# Patient Record
Sex: FEMALE | Race: WHITE | NOT HISPANIC OR LATINO
[De-identification: names, ages, dates, MRNs, and addresses within clinical notes are randomized per-mention and may not be internally consistent; named-entity substitution may affect disease eponyms.]

---

## 2017-01-04 ENCOUNTER — APPOINTMENT (OUTPATIENT)
Dept: ORTHOPEDIC SURGERY | Facility: CLINIC | Age: 77
End: 2017-01-04

## 2017-01-04 VITALS
DIASTOLIC BLOOD PRESSURE: 79 MMHG | HEIGHT: 61 IN | BODY MASS INDEX: 29.07 KG/M2 | WEIGHT: 154 LBS | HEART RATE: 87 BPM | SYSTOLIC BLOOD PRESSURE: 139 MMHG

## 2017-01-04 RX ORDER — ALPRAZOLAM 0.5 MG/1
0.5 TABLET ORAL
Qty: 60 | Refills: 0 | Status: ACTIVE | COMMUNITY
Start: 2017-01-04 | End: 1900-01-01

## 2017-01-05 ENCOUNTER — APPOINTMENT (OUTPATIENT)
Dept: MRI IMAGING | Facility: HOSPITAL | Age: 77
End: 2017-01-05

## 2017-01-05 ENCOUNTER — OUTPATIENT (OUTPATIENT)
Dept: OUTPATIENT SERVICES | Facility: HOSPITAL | Age: 77
LOS: 1 days | End: 2017-01-05
Payer: MEDICARE

## 2017-01-05 PROCEDURE — 73721 MRI JNT OF LWR EXTRE W/O DYE: CPT

## 2017-01-12 ENCOUNTER — APPOINTMENT (OUTPATIENT)
Dept: ORTHOPEDIC SURGERY | Facility: CLINIC | Age: 77
End: 2017-01-12

## 2017-01-12 VITALS
HEIGHT: 61 IN | HEART RATE: 93 BPM | DIASTOLIC BLOOD PRESSURE: 79 MMHG | WEIGHT: 154 LBS | BODY MASS INDEX: 29.07 KG/M2 | SYSTOLIC BLOOD PRESSURE: 172 MMHG

## 2017-01-12 DIAGNOSIS — S83.231D COMPLEX TEAR OF MEDIAL MENISCUS, CURRENT INJURY, RIGHT KNEE, SUBSEQUENT ENCOUNTER: ICD-10-CM

## 2017-01-12 DIAGNOSIS — M19.90 UNSPECIFIED OSTEOARTHRITIS, UNSPECIFIED SITE: ICD-10-CM

## 2017-01-12 RX ORDER — ALPRAZOLAM 0.5 MG/1
0.5 TABLET ORAL
Qty: 60 | Refills: 0 | Status: ACTIVE | COMMUNITY
Start: 2017-01-12 | End: 1900-01-01

## 2017-01-12 RX ADMIN — Medication 0 MG/3ML: at 00:00

## 2017-01-14 PROBLEM — S83.231D COMPLEX TEAR OF MEDIAL MENISCUS OF RIGHT KNEE, UNSPECIFIED WHETHER OLD OR CURRENT TEAR, SUBSEQUENT ENCOUNTER: Status: ACTIVE | Noted: 2017-01-14

## 2017-01-20 RX ORDER — HYALURONATE SOD, CROSS-LINKED 30 MG/3 ML
30 SYRINGE (ML) INTRAARTICULAR
Qty: 0 | Refills: 0 | Status: COMPLETED | OUTPATIENT
Start: 2017-01-12

## 2017-02-01 ENCOUNTER — OUTPATIENT (OUTPATIENT)
Dept: OUTPATIENT SERVICES | Facility: HOSPITAL | Age: 77
LOS: 1 days | Discharge: HOME | End: 2017-02-01
Payer: MEDICARE

## 2017-02-01 VITALS
TEMPERATURE: 98 F | WEIGHT: 156.97 LBS | HEIGHT: 64.5 IN | SYSTOLIC BLOOD PRESSURE: 139 MMHG | DIASTOLIC BLOOD PRESSURE: 55 MMHG | OXYGEN SATURATION: 99 % | HEART RATE: 85 BPM | RESPIRATION RATE: 20 BRPM

## 2017-02-01 DIAGNOSIS — Z90.89 ACQUIRED ABSENCE OF OTHER ORGANS: Chronic | ICD-10-CM

## 2017-02-01 DIAGNOSIS — Z01.818 ENCOUNTER FOR OTHER PREPROCEDURAL EXAMINATION: ICD-10-CM

## 2017-02-01 DIAGNOSIS — S83.221A PERIPHERAL TEAR OF MEDIAL MENISCUS, CURRENT INJURY, RIGHT KNEE, INITIAL ENCOUNTER: ICD-10-CM

## 2017-02-01 DIAGNOSIS — S83.241A OTHER TEAR OF MEDIAL MENISCUS, CURRENT INJURY, RIGHT KNEE, INITIAL ENCOUNTER: ICD-10-CM

## 2017-02-01 DIAGNOSIS — I10 ESSENTIAL (PRIMARY) HYPERTENSION: ICD-10-CM

## 2017-02-01 DIAGNOSIS — Z85.3 PERSONAL HISTORY OF MALIGNANT NEOPLASM OF BREAST: ICD-10-CM

## 2017-02-01 DIAGNOSIS — M17.11 UNILATERAL PRIMARY OSTEOARTHRITIS, RIGHT KNEE: ICD-10-CM
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
## 2017-02-01 DIAGNOSIS — Z98.890 OTHER SPECIFIED POSTPROCEDURAL STATES: Chronic | ICD-10-CM
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
## 2017-02-01 LAB
ANION GAP SERPL CALC-SCNC: 8 MMOL/L — SIGNIFICANT CHANGE UP (ref 5–17)
BASOPHILS # BLD AUTO: 0.1 K/UL — SIGNIFICANT CHANGE UP (ref 0–0.2)
BASOPHILS NFR BLD AUTO: 1.2 % — SIGNIFICANT CHANGE UP (ref 0–2)
BUN SERPL-MCNC: 22 MG/DL — SIGNIFICANT CHANGE UP (ref 7–23)
CALCIUM SERPL-MCNC: 9.2 MG/DL — SIGNIFICANT CHANGE UP (ref 8.5–10.1)
CHLORIDE SERPL-SCNC: 103 MMOL/L — SIGNIFICANT CHANGE UP (ref 96–108)
CO2 SERPL-SCNC: 31 MMOL/L — SIGNIFICANT CHANGE UP (ref 22–31)
CREAT SERPL-MCNC: 0.57 MG/DL — SIGNIFICANT CHANGE UP (ref 0.5–1.3)
EOSINOPHIL # BLD AUTO: 0.2 K/UL — SIGNIFICANT CHANGE UP (ref 0–0.5)
EOSINOPHIL NFR BLD AUTO: 2.8 % — SIGNIFICANT CHANGE UP (ref 0–6)
GLUCOSE SERPL-MCNC: 87 MG/DL — SIGNIFICANT CHANGE UP (ref 70–99)
HCT VFR BLD CALC: 41.5 % — SIGNIFICANT CHANGE UP (ref 34.5–45)
HGB BLD-MCNC: 13.9 G/DL — SIGNIFICANT CHANGE UP (ref 11.5–15.5)
LYMPHOCYTES # BLD AUTO: 2.4 K/UL — SIGNIFICANT CHANGE UP (ref 1–3.3)
LYMPHOCYTES # BLD AUTO: 39.7 % — SIGNIFICANT CHANGE UP (ref 13–44)
MCHC RBC-ENTMCNC: 31 PG — SIGNIFICANT CHANGE UP (ref 27–34)
MCHC RBC-ENTMCNC: 33.5 GM/DL — SIGNIFICANT CHANGE UP (ref 32–36)
MCV RBC AUTO: 92.5 FL — SIGNIFICANT CHANGE UP (ref 80–100)
MONOCYTES # BLD AUTO: 0.5 K/UL — SIGNIFICANT CHANGE UP (ref 0–0.9)
MONOCYTES NFR BLD AUTO: 8.8 % — SIGNIFICANT CHANGE UP (ref 2–14)
NEUTROPHILS # BLD AUTO: 2.8 K/UL — SIGNIFICANT CHANGE UP (ref 1.8–7.4)
NEUTROPHILS NFR BLD AUTO: 47.5 % — SIGNIFICANT CHANGE UP (ref 43–77)
PLATELET # BLD AUTO: 287 K/UL — SIGNIFICANT CHANGE UP (ref 150–400)
POTASSIUM SERPL-MCNC: 4.5 MMOL/L — SIGNIFICANT CHANGE UP (ref 3.5–5.3)
POTASSIUM SERPL-SCNC: 4.5 MMOL/L — SIGNIFICANT CHANGE UP (ref 3.5–5.3)
RBC # BLD: 4.48 M/UL — SIGNIFICANT CHANGE UP (ref 3.8–5.2)
RBC # FLD: 12.1 % — SIGNIFICANT CHANGE UP (ref 10.3–14.5)
SODIUM SERPL-SCNC: 142 MMOL/L — SIGNIFICANT CHANGE UP (ref 135–145)
WBC # BLD: 5.9 K/UL — SIGNIFICANT CHANGE UP (ref 3.8–10.5)
WBC # FLD AUTO: 5.9 K/UL — SIGNIFICANT CHANGE UP (ref 3.8–10.5)

## 2017-02-01 PROCEDURE — 93010 ELECTROCARDIOGRAM REPORT: CPT

## 2017-02-01 RX ORDER — AMLODIPINE BESYLATE 2.5 MG/1
1 TABLET ORAL
Qty: 0 | Refills: 0 | COMMUNITY

## 2017-02-01 RX ORDER — METOPROLOL TARTRATE 50 MG
0 TABLET ORAL
Qty: 0 | Refills: 0 | COMMUNITY

## 2017-02-01 NOTE — H&P PST ADULT - HISTORY OF PRESENT ILLNESS
75 y/o female with right knee meniscus tear. Complain of right knee pain for 3 months now. "I was playing tennis when it started hurting." Pt has difficulty walking distances due to right knee pain. Here today for PST for right knee arthroscopy.

## 2017-02-01 NOTE — H&P PST ADULT - PSH
H/O repair of left rotator cuff  2012  History of tonsillectomy  8 y/o  S/P hysterectomy  in 1980  S/P lumpectomy of breast  left in 1985

## 2017-02-01 NOTE — H&P PST ADULT - NSANTHOSAYNRD_GEN_A_CORE
No. SHADI screening performed.  STOP BANG Legend: 0-2 = LOW Risk; 3-4 = INTERMEDIATE Risk; 5-8 = HIGH Risk

## 2017-02-01 NOTE — H&P PST ADULT - ASSESSMENT
77 y/o female with right knee meniscus tear. Scheduled for right knee arthroscopy with Dr. Gamboa. 77 y/o female with right knee meniscus tear. Scheduled for right knee arthroscopy with Dr. Gamboa.     Plan  1. Stop all NSAIDS, herbal supplements and vitamins for 7 days.  2. NPO at midnight.  3.Use EZ sponges as directed

## 2017-02-01 NOTE — H&P PST ADULT - PMH
Basal cell carcinoma  of face and chest - removed  Breast cancer  in 1985- received radiation treatment  Degenerative disc disease, lumbar    Gastric ulcer  2010  GERD (gastroesophageal reflux disease)    HTN (hypertension)    Osteoarthritis of right knee    Sprain rotator cuff  left, had surgery  Uterine fibroid  S/P hysterectomy

## 2017-02-01 NOTE — H&P PST ADULT - FAMILY HISTORY
Mother  Still living? Unknown  Family history of hypertension in mother, Age at diagnosis: Age Unknown     Father  Still living? No  Family history of lung cancer, Age at diagnosis: Age Unknown

## 2017-02-06 ENCOUNTER — RESULT REVIEW (OUTPATIENT)
Age: 77
End: 2017-02-06

## 2017-02-07 ENCOUNTER — OUTPATIENT (OUTPATIENT)
Dept: OUTPATIENT SERVICES | Facility: HOSPITAL | Age: 77
LOS: 1 days | Discharge: HOME | End: 2017-02-07
Payer: MEDICARE

## 2017-02-07 VITALS
HEART RATE: 82 BPM | TEMPERATURE: 97 F | HEIGHT: 61.5 IN | RESPIRATION RATE: 14 BRPM | OXYGEN SATURATION: 100 % | SYSTOLIC BLOOD PRESSURE: 127 MMHG | DIASTOLIC BLOOD PRESSURE: 70 MMHG | WEIGHT: 155.43 LBS

## 2017-02-07 VITALS
RESPIRATION RATE: 16 BRPM | SYSTOLIC BLOOD PRESSURE: 121 MMHG | DIASTOLIC BLOOD PRESSURE: 58 MMHG | OXYGEN SATURATION: 98 % | HEART RATE: 80 BPM

## 2017-02-07 DIAGNOSIS — Z98.890 OTHER SPECIFIED POSTPROCEDURAL STATES: Chronic | ICD-10-CM

## 2017-02-07 DIAGNOSIS — Z90.89 ACQUIRED ABSENCE OF OTHER ORGANS: Chronic | ICD-10-CM

## 2017-02-07 PROCEDURE — 88304 TISSUE EXAM BY PATHOLOGIST: CPT | Mod: 26

## 2017-02-07 RX ORDER — ASPIRIN/CALCIUM CARB/MAGNESIUM 324 MG
1 TABLET ORAL
Qty: 14 | Refills: 0
Start: 2017-02-07 | End: 2017-02-21

## 2017-02-07 RX ORDER — ONDANSETRON 8 MG/1
4 TABLET, FILM COATED ORAL EVERY 4 HOURS
Qty: 0 | Refills: 0 | Status: DISCONTINUED | OUTPATIENT
Start: 2017-02-07 | End: 2017-02-07

## 2017-02-07 RX ORDER — SODIUM CHLORIDE 9 MG/ML
1000 INJECTION, SOLUTION INTRAVENOUS
Qty: 0 | Refills: 0 | Status: DISCONTINUED | OUTPATIENT
Start: 2017-02-07 | End: 2017-02-22

## 2017-02-07 RX ORDER — KETOROLAC TROMETHAMINE 30 MG/ML
30 SYRINGE (ML) INJECTION ONCE
Qty: 0 | Refills: 0 | Status: DISCONTINUED | OUTPATIENT
Start: 2017-02-07 | End: 2017-02-07

## 2017-02-07 RX ORDER — FENTANYL CITRATE 50 UG/ML
25 INJECTION INTRAVENOUS
Qty: 0 | Refills: 0 | Status: DISCONTINUED | OUTPATIENT
Start: 2017-02-07 | End: 2017-02-07

## 2017-02-07 RX ORDER — ACETAMINOPHEN 500 MG
1000 TABLET ORAL ONCE
Qty: 0 | Refills: 0 | Status: DISCONTINUED | OUTPATIENT
Start: 2017-02-07 | End: 2017-02-07

## 2017-02-07 RX ORDER — OXYCODONE HYDROCHLORIDE 5 MG/1
1 TABLET ORAL
Qty: 40 | Refills: 0
Start: 2017-02-07

## 2017-02-07 RX ORDER — SODIUM CHLORIDE 9 MG/ML
1000 INJECTION, SOLUTION INTRAVENOUS
Qty: 0 | Refills: 0 | Status: DISCONTINUED | OUTPATIENT
Start: 2017-02-07 | End: 2017-02-07

## 2017-02-07 RX ADMIN — Medication 30 MILLIGRAM(S): at 09:58

## 2017-02-07 RX ADMIN — SODIUM CHLORIDE 75 MILLILITER(S): 9 INJECTION, SOLUTION INTRAVENOUS at 09:44

## 2017-02-07 RX ADMIN — FENTANYL CITRATE 25 MICROGRAM(S): 50 INJECTION INTRAVENOUS at 09:33

## 2017-02-07 RX ADMIN — FENTANYL CITRATE 25 MICROGRAM(S): 50 INJECTION INTRAVENOUS at 09:49

## 2017-02-07 NOTE — ASU DISCHARGE PLAN (ADULT/PEDIATRIC). - ACTIVITY LEVEL
weight bearing as tolerated/no heavy lifting/no exercise/no sports/gym elevate extremity/ice, 20min on 20min off while awake/no sports/gym/weight bearing as tolerated/no heavy lifting/no exercise

## 2017-02-07 NOTE — ASU DISCHARGE PLAN (ADULT/PEDIATRIC). - MEDICATION SUMMARY - MEDICATIONS TO TAKE
I will START or STAY ON the medications listed below when I get home from the hospital:    acetaminophen-oxyCODONE 325 mg-5 mg oral tablet  -- 1 tab(s) by mouth every 4 hours MDD:6 PRN pain  -- Caution federal law prohibits the transfer of this drug to any person other  than the person for whom it was prescribed.  May cause drowsiness.  Alcohol may intensify this effect.  Use care when operating dangerous machinery.  This prescription cannot be refilled.  This product contains acetaminophen.  Do not use  with any other product containing acetaminophen to prevent possible liver damage.  Using more of this medication than prescribed may cause serious breathing problems.    -- Indication: For prn pain    Ecotrin 325 mg oral delayed release tablet  -- 1 tab(s) by mouth once a day for ppx MDD:1  -- Swallow whole.  Do not crush.  Take with food or milk.    -- Indication: For dvt ppx    metoprolol tartrate 25 mg oral tablet  --  by mouth once a day (at bedtime)  -- Indication: For per pmd    amLODIPine 5 mg oral tablet  -- 1 tab(s) by mouth once a day (at bedtime)  -- Indication: For per pmd    Co Q-10 100 mg oral capsule  -- 1 cap(s) by mouth once a day  -- Indication: For per pmd    multivitamin  -- 1 tab(s) by mouth once a day  -- Indication: For per pmd    Vitamin D3 2000 intl units oral capsule  -- 2 cap(s) by mouth once a day  -- Indication: For per pmd

## 2017-02-07 NOTE — BRIEF OPERATIVE NOTE - POST-OP DX
Complex tear of medial meniscus of left knee as current injury, initial encounter  02/07/2017    Active  Shelton Redmond

## 2017-02-07 NOTE — ASU DISCHARGE PLAN (ADULT/PEDIATRIC). - NOTIFY
Swelling that continues/Numbness, color, or temperature change to extremity/Pain not relieved by Medications/Bleeding that does not stop Pain not relieved by Medications/Numbness, color, or temperature change to extremity/Unable to Urinate/Persistent Nausea and Vomiting/Bleeding that does not stop/Fever greater than 101/Swelling that continues

## 2017-02-07 NOTE — ASU DISCHARGE PLAN (ADULT/PEDIATRIC). - INSTRUCTIONS
Begin with liquids and light food ( tea, toast, Jello, soups). Advance to what you normally eat. Liquids should taken in adequate amounts today. call for follow up in 7-10 days

## 2017-02-07 NOTE — BRIEF OPERATIVE NOTE - PRE-OP DX
Complex tear of medial meniscus of right knee as current injury, initial encounter  02/07/2017    Active  Shelton Redmond

## 2017-02-07 NOTE — ASU DISCHARGE PLAN (ADULT/PEDIATRIC). - NURSING INSTRUCTIONS
If you cannot reach the doctor call [facility] Emergency Department at [phone] or go to your local Emergency Department.  A responsible adult should be with you for the rest of the day and night for your safety and to help you if you needed. Resume your medications as listed on the attached Medication Record.

## 2017-02-09 LAB — SURGICAL PATHOLOGY FINAL REPORT - CH: SIGNIFICANT CHANGE UP

## 2017-02-13 DIAGNOSIS — I10 ESSENTIAL (PRIMARY) HYPERTENSION: ICD-10-CM
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
## 2017-02-13 DIAGNOSIS — M17.11 UNILATERAL PRIMARY OSTEOARTHRITIS, RIGHT KNEE: ICD-10-CM

## 2017-02-13 DIAGNOSIS — Z88.8 ALLERGY STATUS TO OTHER DRUGS, MEDICAMENTS AND BIOLOGICAL SUBSTANCES STATUS: ICD-10-CM

## 2017-02-13 DIAGNOSIS — S83.241A OTHER TEAR OF MEDIAL MENISCUS, CURRENT INJURY, RIGHT KNEE, INITIAL ENCOUNTER: ICD-10-CM

## 2017-02-13 DIAGNOSIS — Z88.5 ALLERGY STATUS TO NARCOTIC AGENT: ICD-10-CM
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
## 2017-02-13 DIAGNOSIS — K21.9 GASTRO-ESOPHAGEAL REFLUX DISEASE WITHOUT ESOPHAGITIS: ICD-10-CM

## 2017-02-13 DIAGNOSIS — Z85.3 PERSONAL HISTORY OF MALIGNANT NEOPLASM OF BREAST: ICD-10-CM

## 2017-07-25 ENCOUNTER — APPOINTMENT (OUTPATIENT)
Dept: ULTRASOUND IMAGING | Facility: IMAGING CENTER | Age: 77
End: 2017-07-25

## 2017-07-25 ENCOUNTER — APPOINTMENT (OUTPATIENT)
Dept: MAMMOGRAPHY | Facility: IMAGING CENTER | Age: 77
End: 2017-07-25

## 2017-07-25 ENCOUNTER — OUTPATIENT (OUTPATIENT)
Dept: OUTPATIENT SERVICES | Facility: HOSPITAL | Age: 77
LOS: 1 days | End: 2017-07-25
Payer: MEDICARE

## 2017-07-25 DIAGNOSIS — Z00.8 ENCOUNTER FOR OTHER GENERAL EXAMINATION: ICD-10-CM

## 2017-07-25 DIAGNOSIS — Z90.89 ACQUIRED ABSENCE OF OTHER ORGANS: Chronic | ICD-10-CM

## 2017-07-25 DIAGNOSIS — Z98.890 OTHER SPECIFIED POSTPROCEDURAL STATES: Chronic | ICD-10-CM

## 2017-07-25 PROCEDURE — 77063 BREAST TOMOSYNTHESIS BI: CPT

## 2017-07-25 PROCEDURE — 77067 SCR MAMMO BI INCL CAD: CPT

## 2017-07-25 PROCEDURE — 76641 ULTRASOUND BREAST COMPLETE: CPT

## 2018-04-27 ENCOUNTER — APPOINTMENT (OUTPATIENT)
Dept: ORTHOPEDIC SURGERY | Facility: CLINIC | Age: 78
End: 2018-04-27
Payer: MEDICARE

## 2018-04-27 VITALS — WEIGHT: 150 LBS | HEIGHT: 61 IN | BODY MASS INDEX: 28.32 KG/M2

## 2018-04-27 VITALS — HEART RATE: 98 BPM | SYSTOLIC BLOOD PRESSURE: 189 MMHG | DIASTOLIC BLOOD PRESSURE: 97 MMHG

## 2018-04-27 DIAGNOSIS — S52.532A COLLES' FRACTURE OF LEFT RADIUS, INITIAL ENCOUNTER FOR CLOSED FRACTURE: ICD-10-CM

## 2018-04-27 PROCEDURE — 73110 X-RAY EXAM OF WRIST: CPT | Mod: LT

## 2018-04-27 PROCEDURE — 25600 CLTX DST RDL FX/EPHYS SEP WO: CPT | Mod: LT

## 2018-04-27 PROCEDURE — 99214 OFFICE O/P EST MOD 30 MIN: CPT | Mod: 57

## 2018-04-27 RX ORDER — SUCRALFATE 1 G/1
1 TABLET ORAL
Qty: 60 | Refills: 0 | Status: ACTIVE | COMMUNITY
Start: 2017-11-20

## 2018-04-27 RX ORDER — HYDROCHLOROTHIAZIDE 12.5 MG/1
12.5 TABLET ORAL
Qty: 45 | Refills: 0 | Status: ACTIVE | COMMUNITY
Start: 2017-11-20

## 2018-04-27 RX ORDER — METOPROLOL SUCCINATE 25 MG/1
25 TABLET, EXTENDED RELEASE ORAL
Qty: 90 | Refills: 0 | Status: ACTIVE | COMMUNITY
Start: 2017-11-20

## 2018-04-27 RX ORDER — TRAMADOL HYDROCHLORIDE 50 MG/1
50 TABLET, COATED ORAL
Qty: 30 | Refills: 0 | Status: ACTIVE | COMMUNITY
Start: 2018-04-27 | End: 1900-01-01

## 2018-04-27 RX ORDER — DICLOFENAC SODIUM 50 MG/1
50 TABLET, DELAYED RELEASE ORAL
Qty: 60 | Refills: 0 | Status: ACTIVE | COMMUNITY
Start: 2017-11-20

## 2018-05-07 ENCOUNTER — TRANSCRIPTION ENCOUNTER (OUTPATIENT)
Age: 78
End: 2018-05-07

## 2018-05-17 ENCOUNTER — APPOINTMENT (OUTPATIENT)
Dept: ORTHOPEDIC SURGERY | Facility: CLINIC | Age: 78
End: 2018-05-17
Payer: MEDICARE

## 2018-05-17 VITALS
SYSTOLIC BLOOD PRESSURE: 169 MMHG | BODY MASS INDEX: 28.32 KG/M2 | WEIGHT: 150 LBS | HEIGHT: 61 IN | DIASTOLIC BLOOD PRESSURE: 75 MMHG | HEART RATE: 89 BPM

## 2018-05-17 PROCEDURE — 99024 POSTOP FOLLOW-UP VISIT: CPT

## 2018-05-17 PROCEDURE — 73110 X-RAY EXAM OF WRIST: CPT | Mod: LT

## 2018-06-07 ENCOUNTER — APPOINTMENT (OUTPATIENT)
Dept: ORTHOPEDIC SURGERY | Facility: CLINIC | Age: 78
End: 2018-06-07
Payer: MEDICARE

## 2018-06-07 VITALS
WEIGHT: 150 LBS | HEIGHT: 61 IN | HEART RATE: 76 BPM | DIASTOLIC BLOOD PRESSURE: 82 MMHG | BODY MASS INDEX: 28.32 KG/M2 | SYSTOLIC BLOOD PRESSURE: 157 MMHG

## 2018-06-07 PROCEDURE — 73110 X-RAY EXAM OF WRIST: CPT

## 2018-06-07 PROCEDURE — 99024 POSTOP FOLLOW-UP VISIT: CPT

## 2018-07-03 ENCOUNTER — APPOINTMENT (OUTPATIENT)
Dept: ORTHOPEDIC SURGERY | Facility: CLINIC | Age: 78
End: 2018-07-03
Payer: MEDICARE

## 2018-07-03 VITALS
SYSTOLIC BLOOD PRESSURE: 148 MMHG | HEART RATE: 80 BPM | DIASTOLIC BLOOD PRESSURE: 77 MMHG | WEIGHT: 150 LBS | HEIGHT: 61 IN | BODY MASS INDEX: 28.32 KG/M2

## 2018-07-03 PROCEDURE — 73110 X-RAY EXAM OF WRIST: CPT

## 2018-07-03 PROCEDURE — 99024 POSTOP FOLLOW-UP VISIT: CPT

## 2018-07-27 ENCOUNTER — OUTPATIENT (OUTPATIENT)
Dept: OUTPATIENT SERVICES | Facility: HOSPITAL | Age: 78
LOS: 1 days | End: 2018-07-27
Payer: MEDICARE

## 2018-07-27 ENCOUNTER — APPOINTMENT (OUTPATIENT)
Dept: ULTRASOUND IMAGING | Facility: IMAGING CENTER | Age: 78
End: 2018-07-27
Payer: MEDICARE

## 2018-07-27 ENCOUNTER — APPOINTMENT (OUTPATIENT)
Dept: MAMMOGRAPHY | Facility: IMAGING CENTER | Age: 78
End: 2018-07-27
Payer: MEDICARE

## 2018-07-27 DIAGNOSIS — Z00.8 ENCOUNTER FOR OTHER GENERAL EXAMINATION: ICD-10-CM

## 2018-07-27 DIAGNOSIS — Z98.890 OTHER SPECIFIED POSTPROCEDURAL STATES: Chronic | ICD-10-CM

## 2018-07-27 DIAGNOSIS — Z90.89 ACQUIRED ABSENCE OF OTHER ORGANS: Chronic | ICD-10-CM

## 2018-07-27 PROBLEM — M17.11 UNILATERAL PRIMARY OSTEOARTHRITIS, RIGHT KNEE: Chronic | Status: ACTIVE | Noted: 2017-02-01

## 2018-07-27 PROBLEM — D25.9 LEIOMYOMA OF UTERUS, UNSPECIFIED: Chronic | Status: ACTIVE | Noted: 2017-02-01

## 2018-07-27 PROBLEM — C44.91 BASAL CELL CARCINOMA OF SKIN, UNSPECIFIED: Chronic | Status: ACTIVE | Noted: 2017-02-01

## 2018-07-27 PROBLEM — M51.36 OTHER INTERVERTEBRAL DISC DEGENERATION, LUMBAR REGION: Chronic | Status: ACTIVE | Noted: 2017-02-01

## 2018-07-27 PROCEDURE — 77063 BREAST TOMOSYNTHESIS BI: CPT | Mod: 26

## 2018-07-27 PROCEDURE — 77067 SCR MAMMO BI INCL CAD: CPT | Mod: 26

## 2018-07-27 PROCEDURE — 77063 BREAST TOMOSYNTHESIS BI: CPT

## 2018-07-27 PROCEDURE — 76641 ULTRASOUND BREAST COMPLETE: CPT

## 2018-07-27 PROCEDURE — 77067 SCR MAMMO BI INCL CAD: CPT

## 2018-07-27 PROCEDURE — 76641 ULTRASOUND BREAST COMPLETE: CPT | Mod: 26,50

## 2018-08-07 ENCOUNTER — APPOINTMENT (OUTPATIENT)
Dept: ORTHOPEDIC SURGERY | Facility: CLINIC | Age: 78
End: 2018-08-07
Payer: MEDICARE

## 2018-08-07 VITALS — SYSTOLIC BLOOD PRESSURE: 143 MMHG | DIASTOLIC BLOOD PRESSURE: 81 MMHG | HEART RATE: 87 BPM

## 2018-08-07 DIAGNOSIS — S52.532D COLLES' FRACTURE OF LEFT RADIUS, SUBSEQUENT ENCOUNTER FOR CLOSED FRACTURE WITH ROUTINE HEALING: ICD-10-CM

## 2018-08-07 PROCEDURE — 99213 OFFICE O/P EST LOW 20 MIN: CPT

## 2018-11-05 ENCOUNTER — OUTPATIENT (OUTPATIENT)
Dept: OUTPATIENT SERVICES | Facility: HOSPITAL | Age: 78
LOS: 1 days | End: 2018-11-05
Payer: MEDICARE

## 2018-11-05 ENCOUNTER — APPOINTMENT (OUTPATIENT)
Dept: RADIOLOGY | Facility: IMAGING CENTER | Age: 78
End: 2018-11-05
Payer: MEDICARE

## 2018-11-05 DIAGNOSIS — M85.80 OTHER SPECIFIED DISORDERS OF BONE DENSITY AND STRUCTURE, UNSPECIFIED SITE: ICD-10-CM

## 2018-11-05 DIAGNOSIS — Z98.890 OTHER SPECIFIED POSTPROCEDURAL STATES: Chronic | ICD-10-CM

## 2018-11-05 DIAGNOSIS — Z90.89 ACQUIRED ABSENCE OF OTHER ORGANS: Chronic | ICD-10-CM

## 2018-11-05 PROCEDURE — 77080 DXA BONE DENSITY AXIAL: CPT

## 2018-11-05 PROCEDURE — 77080 DXA BONE DENSITY AXIAL: CPT | Mod: 26

## 2018-11-06 ENCOUNTER — OUTPATIENT (OUTPATIENT)
Dept: OUTPATIENT SERVICES | Facility: HOSPITAL | Age: 78
LOS: 1 days | End: 2018-11-06
Payer: MEDICARE

## 2018-11-06 ENCOUNTER — APPOINTMENT (OUTPATIENT)
Dept: RADIOLOGY | Facility: HOSPITAL | Age: 78
End: 2018-11-06
Payer: MEDICARE

## 2018-11-06 DIAGNOSIS — R05 COUGH: ICD-10-CM

## 2018-11-06 DIAGNOSIS — Z90.89 ACQUIRED ABSENCE OF OTHER ORGANS: Chronic | ICD-10-CM

## 2018-11-06 DIAGNOSIS — Z98.890 OTHER SPECIFIED POSTPROCEDURAL STATES: Chronic | ICD-10-CM

## 2018-11-06 PROCEDURE — 71046 X-RAY EXAM CHEST 2 VIEWS: CPT

## 2018-11-06 PROCEDURE — 71046 X-RAY EXAM CHEST 2 VIEWS: CPT | Mod: 26

## 2019-09-19 ENCOUNTER — OUTPATIENT (OUTPATIENT)
Dept: OUTPATIENT SERVICES | Facility: HOSPITAL | Age: 79
LOS: 1 days | End: 2019-09-19
Payer: MEDICARE

## 2019-09-19 ENCOUNTER — APPOINTMENT (OUTPATIENT)
Dept: ULTRASOUND IMAGING | Facility: IMAGING CENTER | Age: 79
End: 2019-09-19
Payer: MEDICARE

## 2019-09-19 ENCOUNTER — APPOINTMENT (OUTPATIENT)
Dept: MAMMOGRAPHY | Facility: IMAGING CENTER | Age: 79
End: 2019-09-19
Payer: MEDICARE

## 2019-09-19 DIAGNOSIS — Z98.890 OTHER SPECIFIED POSTPROCEDURAL STATES: Chronic | ICD-10-CM

## 2019-09-19 DIAGNOSIS — Z90.89 ACQUIRED ABSENCE OF OTHER ORGANS: Chronic | ICD-10-CM

## 2019-09-19 DIAGNOSIS — Z00.8 ENCOUNTER FOR OTHER GENERAL EXAMINATION: ICD-10-CM

## 2019-09-19 PROCEDURE — 76641 ULTRASOUND BREAST COMPLETE: CPT

## 2019-09-19 PROCEDURE — 76641 ULTRASOUND BREAST COMPLETE: CPT | Mod: 26,50

## 2019-09-19 PROCEDURE — 77066 DX MAMMO INCL CAD BI: CPT

## 2019-09-19 PROCEDURE — 77066 DX MAMMO INCL CAD BI: CPT | Mod: 26

## 2019-09-19 PROCEDURE — G0279: CPT

## 2019-09-19 PROCEDURE — G0279: CPT | Mod: 26

## 2019-09-20 ENCOUNTER — TRANSCRIPTION ENCOUNTER (OUTPATIENT)
Age: 79
End: 2019-09-20

## 2019-09-24 ENCOUNTER — APPOINTMENT (OUTPATIENT)
Dept: ULTRASOUND IMAGING | Facility: IMAGING CENTER | Age: 79
End: 2019-09-24
Payer: MEDICARE

## 2019-09-24 ENCOUNTER — OUTPATIENT (OUTPATIENT)
Dept: OUTPATIENT SERVICES | Facility: HOSPITAL | Age: 79
LOS: 1 days | End: 2019-09-24
Payer: MEDICARE

## 2019-09-24 ENCOUNTER — RESULT REVIEW (OUTPATIENT)
Age: 79
End: 2019-09-24

## 2019-09-24 DIAGNOSIS — R92.8 OTHER ABNORMAL AND INCONCLUSIVE FINDINGS ON DIAGNOSTIC IMAGING OF BREAST: ICD-10-CM

## 2019-09-24 DIAGNOSIS — Z98.890 OTHER SPECIFIED POSTPROCEDURAL STATES: Chronic | ICD-10-CM

## 2019-09-24 DIAGNOSIS — Z90.89 ACQUIRED ABSENCE OF OTHER ORGANS: Chronic | ICD-10-CM

## 2019-09-24 PROCEDURE — 19083 BX BREAST 1ST LESION US IMAG: CPT | Mod: LT

## 2019-09-24 PROCEDURE — A4648: CPT

## 2019-09-24 PROCEDURE — 88305 TISSUE EXAM BY PATHOLOGIST: CPT | Mod: 26

## 2019-09-24 PROCEDURE — 19083 BX BREAST 1ST LESION US IMAG: CPT

## 2019-09-24 PROCEDURE — 88305 TISSUE EXAM BY PATHOLOGIST: CPT

## 2019-09-24 PROCEDURE — 77065 DX MAMMO INCL CAD UNI: CPT | Mod: 26,LT

## 2019-09-24 PROCEDURE — 77065 DX MAMMO INCL CAD UNI: CPT

## 2019-12-10 NOTE — ASU DISCHARGE PLAN (ADULT/PEDIATRIC). - RN NAME (PRINT)_____________________________________________
FOLLOW UP VISIT     Verbal permission granted by patient to leave a detailed message with medical information at phone number listed in Epic. yes    Patient is female, are you pregnant or breastfeeding ? No    Patient is here today with Self    Chief Complaint   Patient presents with   • Procedure     Biopsy on the left upper back      HISTORY OF PRESENT ILLNESS: The patient is a 37 year old  female seen today for follow up for biopsy on the left upper back, suspect nevus with 2 pigment patterns, growth since initial visit.   Patient was last seen on 10/23/19.      DERM-RELEVANT HISTORY:  History of skin cancer-Negative     -left abdomen, junctional nevus with moderate atypia, follow up of site recommended, biopsied 10/23/18,   -left upper back, lentiginous compound dysplastic nevus, biopsied 10/23/18, re-biopsy 10/23/19 showed recurrent nevus    Family history of skin cancer--No family history of skin cancer   History of tanning bed use- Yes in the past, not for the last 5 years  Outdoor hobbies- Hiking, walking, running  Sun protective measures-SPF30  Occupation-[employer] benefits for school [employer]  Verbal consent obtained to examine genitals, buttocks Yes.    PAST MEDICAL HISTORY:  [x]  None  []  Melanoma    []  Asthma []  Skin Cancer  []  Eczema []  Keloids  []  Allergies []  Psoriasis  []  Vitiligo      I have reviewed the past medical history, family history, social history, medications and allergies listed in the medical record as obtained by my nursing staff and support staff and agree with their documentation        PHYSICAL EXAM:   There were no vitals filed for this visit.  GENERAL:  Pleasant, alert, no acute distress, well-groomed.    Skin: Examination of the left upper back was performed and findings were within normal limits unless specified below.      Findings include:  -left upper back superior brown thin papule 5 mm with amorphus pigment at left edge and reticular pigment at  right    ASSESSMENT AND PLAN:   Neoplasm of uncertain behavior of the skin: Left upper back. Suspect 2 pigment pattern nevus, ddx includes atypical pigmented lesion.  Growth since initial visit.  Shave biopsy performed today as detailed below.    SHAVE BIOPSY PROCEDURE NOTE  Verbal informed consent was obtained after discussing risks including pain, bleeding, infection, recurrence, and scarring. The left upper back superior (site) was sterilely prepped with alcohol, which was allowed to dry completely, and then locally infiltrated with 1% lidocaine with  epinephrine, 1 ml total. A shave biopsy was obtained using a blue blade and the specimen was sent to pathology. Hemostasis was obtained with aluminum chloride. Petrolatum ointment and a clean dressing were applied. The patient tolerated the procedure well without complications. Verbal and written wound care instructions were given.    Return to clinic for yearly skin exam    On 12/10/2019, Liseth GOMEZ scribed the services personally performed by MD JASON Colmenares Allison J Schaus, MD, attest that the documentation recorded by the scribe accurately and completely reflects the service(s) I personally performed and the decisions made by me. I also reviewed and verified the scribe's note, which I edited as appropriate.              Statement Selected

## 2020-05-15 ENCOUNTER — EMERGENCY (EMERGENCY)
Facility: HOSPITAL | Age: 80
LOS: 1 days | Discharge: HOME | End: 2020-05-15
Attending: EMERGENCY MEDICINE | Admitting: EMERGENCY MEDICINE
Payer: MEDICARE

## 2020-05-15 VITALS
RESPIRATION RATE: 18 BRPM | SYSTOLIC BLOOD PRESSURE: 173 MMHG | WEIGHT: 149.91 LBS | DIASTOLIC BLOOD PRESSURE: 85 MMHG | TEMPERATURE: 98 F | HEIGHT: 62 IN | HEART RATE: 83 BPM | OXYGEN SATURATION: 100 %

## 2020-05-15 VITALS
HEART RATE: 70 BPM | DIASTOLIC BLOOD PRESSURE: 48 MMHG | OXYGEN SATURATION: 99 % | SYSTOLIC BLOOD PRESSURE: 143 MMHG | RESPIRATION RATE: 18 BRPM

## 2020-05-15 DIAGNOSIS — Z98.890 OTHER SPECIFIED POSTPROCEDURAL STATES: Chronic | ICD-10-CM

## 2020-05-15 DIAGNOSIS — Z90.89 ACQUIRED ABSENCE OF OTHER ORGANS: Chronic | ICD-10-CM

## 2020-05-15 LAB
ALBUMIN SERPL ELPH-MCNC: 3.6 G/DL — SIGNIFICANT CHANGE UP (ref 3.3–5)
ALP SERPL-CCNC: 66 U/L — SIGNIFICANT CHANGE UP (ref 40–120)
ALT FLD-CCNC: 16 U/L — SIGNIFICANT CHANGE UP (ref 10–45)
ANION GAP SERPL CALC-SCNC: 7 MMOL/L — SIGNIFICANT CHANGE UP (ref 5–17)
AST SERPL-CCNC: 18 U/L — SIGNIFICANT CHANGE UP (ref 10–40)
BILIRUB SERPL-MCNC: 0.6 MG/DL — SIGNIFICANT CHANGE UP (ref 0.2–1.2)
BUN SERPL-MCNC: 8 MG/DL — SIGNIFICANT CHANGE UP (ref 7–23)
CALCIUM SERPL-MCNC: 9.4 MG/DL — SIGNIFICANT CHANGE UP (ref 8.4–10.5)
CHLORIDE SERPL-SCNC: 106 MMOL/L — SIGNIFICANT CHANGE UP (ref 96–108)
CO2 SERPL-SCNC: 29 MMOL/L — SIGNIFICANT CHANGE UP (ref 22–31)
CREAT SERPL-MCNC: 0.78 MG/DL — SIGNIFICANT CHANGE UP (ref 0.5–1.3)
GLUCOSE SERPL-MCNC: 129 MG/DL — HIGH (ref 70–99)
HCT VFR BLD CALC: 43.1 % — SIGNIFICANT CHANGE UP (ref 34.5–45)
HGB BLD-MCNC: 14.2 G/DL — SIGNIFICANT CHANGE UP (ref 11.5–15.5)
LIDOCAIN IGE QN: 123 U/L — SIGNIFICANT CHANGE UP (ref 73–393)
MCHC RBC-ENTMCNC: 30.3 PG — SIGNIFICANT CHANGE UP (ref 27–34)
MCHC RBC-ENTMCNC: 32.9 GM/DL — SIGNIFICANT CHANGE UP (ref 32–36)
MCV RBC AUTO: 91.9 FL — SIGNIFICANT CHANGE UP (ref 80–100)
NRBC # BLD: 0 /100 WBCS — SIGNIFICANT CHANGE UP (ref 0–0)
PLATELET # BLD AUTO: 228 K/UL — SIGNIFICANT CHANGE UP (ref 150–400)
POTASSIUM SERPL-MCNC: 3.8 MMOL/L — SIGNIFICANT CHANGE UP (ref 3.5–5.3)
POTASSIUM SERPL-SCNC: 3.8 MMOL/L — SIGNIFICANT CHANGE UP (ref 3.5–5.3)
PROT SERPL-MCNC: 7 G/DL — SIGNIFICANT CHANGE UP (ref 6–8.3)
RBC # BLD: 4.69 M/UL — SIGNIFICANT CHANGE UP (ref 3.8–5.2)
RBC # FLD: 12.3 % — SIGNIFICANT CHANGE UP (ref 10.3–14.5)
SODIUM SERPL-SCNC: 142 MMOL/L — SIGNIFICANT CHANGE UP (ref 135–145)
TROPONIN I SERPL-MCNC: <.017 NG/ML — LOW (ref 0.02–0.06)
WBC # BLD: 5.89 K/UL — SIGNIFICANT CHANGE UP (ref 3.8–10.5)
WBC # FLD AUTO: 5.89 K/UL — SIGNIFICANT CHANGE UP (ref 3.8–10.5)

## 2020-05-15 PROCEDURE — 93005 ELECTROCARDIOGRAM TRACING: CPT

## 2020-05-15 PROCEDURE — 96374 THER/PROPH/DIAG INJ IV PUSH: CPT

## 2020-05-15 PROCEDURE — 93010 ELECTROCARDIOGRAM REPORT: CPT

## 2020-05-15 PROCEDURE — 99284 EMERGENCY DEPT VISIT MOD MDM: CPT

## 2020-05-15 PROCEDURE — 84484 ASSAY OF TROPONIN QUANT: CPT

## 2020-05-15 PROCEDURE — 83690 ASSAY OF LIPASE: CPT

## 2020-05-15 PROCEDURE — 85027 COMPLETE CBC AUTOMATED: CPT

## 2020-05-15 PROCEDURE — 71045 X-RAY EXAM CHEST 1 VIEW: CPT | Mod: 26

## 2020-05-15 PROCEDURE — 80053 COMPREHEN METABOLIC PANEL: CPT

## 2020-05-15 PROCEDURE — 99285 EMERGENCY DEPT VISIT HI MDM: CPT

## 2020-05-15 PROCEDURE — 36415 COLL VENOUS BLD VENIPUNCTURE: CPT

## 2020-05-15 PROCEDURE — 71045 X-RAY EXAM CHEST 1 VIEW: CPT

## 2020-05-15 RX ORDER — CHOLECALCIFEROL (VITAMIN D3) 125 MCG
2 CAPSULE ORAL
Qty: 0 | Refills: 0 | DISCHARGE

## 2020-05-15 RX ORDER — PANTOPRAZOLE SODIUM 20 MG/1
40 TABLET, DELAYED RELEASE ORAL ONCE
Refills: 0 | Status: COMPLETED | OUTPATIENT
Start: 2020-05-15 | End: 2020-05-15

## 2020-05-15 RX ORDER — LIDOCAINE 4 G/100G
5 CREAM TOPICAL ONCE
Refills: 0 | Status: COMPLETED | OUTPATIENT
Start: 2020-05-15 | End: 2020-05-15

## 2020-05-15 RX ORDER — FAMOTIDINE 10 MG/ML
1 INJECTION INTRAVENOUS
Qty: 30 | Refills: 0
Start: 2020-05-15 | End: 2020-05-29

## 2020-05-15 RX ADMIN — LIDOCAINE 5 MILLILITER(S): 4 CREAM TOPICAL at 10:36

## 2020-05-15 RX ADMIN — PANTOPRAZOLE SODIUM 40 MILLIGRAM(S): 20 TABLET, DELAYED RELEASE ORAL at 10:37

## 2020-05-15 RX ADMIN — Medication 30 MILLILITER(S): at 10:36

## 2020-05-15 NOTE — ED PROVIDER NOTE - CLINICAL SUMMARY MEDICAL DECISION MAKING FREE TEXT BOX
80 y/o F with h/o HTN (metoprolol, Norvasc), Anxiety (Xanax prn), GI ulcers (Nexium 40mg bid, Carafate, Zofran) pw exacerbation of acid reflux, belching and epigastric discomfort x 10 days. Follows GI Dr. Celeste Sagastume- last endoscopy was 3 years ago. negative for H. Pylori.  Denies shortness of breath, vomiting, diarrhea, blood in stool, fever, chills, weakness. Appears anxious on exam.  Denies smoking, ETOH history.  Denies fam h/o cardiac disease. Will obtain basic labs with Trop and Lipase, check CXR, administer Protonix/Maalox/Viscous Lido and reassess. Will need outpt GI follow up for updated Endoscopy 78 y/o F with h/o HTN (metoprolol, Norvasc), Anxiety (Xanax prn), GI ulcers (Nexium 40mg bid, Carafate, Zofran) pw exacerbation of acid reflux, belching and epigastric discomfort x 10 days. Follows GI Dr. Celeste Sagastume- last endoscopy was 3 years ago. negative for H. Pylori.  Denies shortness of breath, vomiting, diarrhea, blood in stool, fever, chills, weakness. Appears anxious on exam.  Denies smoking, ETOH history.  Denies family h/o cardiac disease. Will obtain basic labs with Trop and Lipase, check CXR, administer Protonix/Maalox/Viscous Lido and reassess. Will need outpatient GI follow up for updated Endoscopy 80 y/o F with h/o HTN (metoprolol, Norvasc), Anxiety (Xanax prn), GI ulcers (Nexium 40mg bid, Carafate, Zofran) pw exacerbation of acid reflux, belching and epigastric discomfort x 10 days. Follows GI Dr. Celeste Sagastume- last endoscopy was 3 years ago. negative for H. Pylori.  Denies shortness of breath, vomiting, diarrhea, blood in stool, fever, chills, weakness. Appears anxious on exam.  Denies smoking, ETOH history.  Denies family h/o cardiac disease. Will obtain basic labs with Trop and Lipase, check CXR, administer Protonix/Maalox/Viscous Lido and reassess. Will need outpatient GI follow up for updated Endoscopy  ** Update- Feeling slightly better s/p meds. Labs and Trops WNL. EKG and CXR WNL. Will DC with GI follow up. Strict ED return precautions discussed. Patient understands and agrees.

## 2020-05-15 NOTE — ED PROVIDER NOTE - CARE PROVIDER_API CALL
LUCIO HANNA  GASTROENTEROLOGY  [address] [place] [address]  Phone: [phone]  Fax: [phone]  Follow Up Time:

## 2020-05-15 NOTE — ED PROVIDER NOTE - ATTENDING CONTRIBUTION TO CARE
Denise with [facility]. 78 y/o F with h/o HTN (metoprolol, Norvasc), Anxiety (Xanax prn), GI ulcers (Nexium 40mg bid, Carafate, Zofran) pw exacerbation of acid reflux, belching and epigastric discomfort x 10 days. Follows GI Dr. Celeste Sagastume- last endoscopy was 3 years ago. negative for H. Pylori.  Denies shortness of breath, vomiting, diarrhea, blood in stool, fever, chills, weakness. Appears anxious on exam.  Denies smoking, ETOH history.  Denies family h/o cardiac disease. Will obtain basic labs with Trop and Lipase, check CXR, administer Protonix/Maalox/Viscous Lido and reassess. Will need outpatient GI follow up for updated Endoscopy  I performed a face to face bedside interview with patient regarding history of present illness, review of symptoms and past medical history. I completed an independent physical exam.  I have discussed the patient's plan of care with Physician Assistant (PA). I agree with note as stated above, having amended the EMR as needed to reflect my findings.   This includes History of Present Illness, HIV, Past Medical/Surgical/Family/Social History, Allergies and Home Medications, Review of Systems, Physical Exam, and any Progress Notes during the time I functioned as the attending physician for this patient.

## 2020-05-15 NOTE — ED ADULT NURSE NOTE - PSH
H/O repair of left rotator cuff  2012  History of tonsillectomy  10 y/o  S/P hysterectomy  in 1980  S/P lumpectomy of breast  left in 1985

## 2020-05-15 NOTE — ED PROVIDER NOTE - NSFOLLOWUPINSTRUCTIONS_ED_ALL_ED_FT
Follow up with Gastroenterology within the week- referral above or you can call: Find a Physician helpline ([phone]) for assistance   Pepcid 20mg 1 tab 2x/day 30 minutes before meals.   Sleep with your bed elevated.  Do not eat after 7pm  Decreased coffee, alcohol, spicy and acidic foods in your diet  Worsening, continued or ANY new concerning symptoms return to the emergency department.    Gastroesophageal Reflux Disease, Adult    Gastroesophageal reflux (RICO) happens when acid from the stomach flows up into the tube that connects the mouth and the stomach (esophagus). Normally, food travels down the esophagus and stays in the stomach to be digested. However, when a person has RICO, food and stomach acid sometimes move back up into the esophagus. If this becomes a more serious problem, the person may be diagnosed with a disease called gastroesophageal reflux disease (GERD). GERD occurs when the reflux:  Happens often. Causes frequent or severe symptoms. Causes problems such as damage to the esophagus.When stomach acid comes in contact with the esophagus, the acid may cause soreness (inflammation) in the esophagus. Over time, GERD may create small holes (ulcers) in the lining of the esophagus.  What are the causes?  This condition is caused by a problem with the muscle between the esophagus and the stomach (lower esophageal sphincter, or LES). Normally, the LES muscle closes after food passes through the esophagus to the stomach. When the LES is weakened or abnormal, it does not close properly, and that allows food and stomach acid to go back up into the esophagus.  The LES can be weakened by certain dietary substances, medicines, and medical conditions, including:  Tobacco use.Pregnancy.Having a hiatal hernia.Alcohol use.Certain foods and beverages, such as coffee, chocolate, onions, and peppermint.What increases the risk?  You are more likely to develop this condition if you:  Have an increased body weight.Have a connective tissue disorder.Use NSAID medicines.What are the signs or symptoms?  Symptoms of this condition include:  Heartburn.Difficult or painful swallowing.The feeling of having a lump in the throat.A bitter taste in the mouth.Bad breath.Having a large amount of saliva.Having an upset or bloated stomach.Belching.Chest pain. Different conditions can cause chest pain. Make sure you see your health care provider if you experience chest pain.Shortness of breath or wheezing.Ongoing (chronic) cough or a night-time cough.Wearing away of tooth enamel.Weight loss.How is this diagnosed?  Your health care provider will take a medical history and perform a physical exam. To determine if you have mild or severe GERD, your health care provider may also monitor how you respond to treatment. You may also have tests, including:  A test to examine your stomach and esophagus with a small camera (endoscopy).A test that measures the acidity level in your esophagus.A test that measures how much pressure is on your esophagus.A barium swallow or modified barium swallow test to show the shape, size, and functioning of your esophagus.How is this treated?  The goal of treatment is to help relieve your symptoms and to prevent complications. Treatment for this condition may vary depending on how severe your symptoms are. Your health care provider may recommend:  Changes to your diet.Medicine.Surgery.Follow these instructions at home:  Eating and drinking        Follow a diet as recommended by your health care provider. This may involve avoiding foods and drinks such as:  Coffee and tea (with or without caffeine).Drinks that contain alcohol.Energy drinks and sports drinks.Carbonated drinks or sodas.Chocolate and cocoa.Peppermint and mint flavorings.Garlic and onions.Horseradish.Spicy and acidic foods, including peppers, chili powder, carreno powder, vinegar, hot sauces, and barbecue sauce.Citrus fruit juices and citrus fruits, such as oranges, lopez, and limes.Tomato-based foods, such as red sauce, chili, salsa, and pizza with red sauce.Fried and fatty foods, such as donuts, french fries, potato chips, and high-fat dressings.High-fat meats, such as hot dogs and fatty cuts of red and white meats, such as rib eye steak, sausage, ham, and navarro.High-fat dairy items, such as whole milk, butter, and cream cheese.Eat small, frequent meals instead of large meals.Avoid drinking large amounts of liquid with your meals.Avoid eating meals during the 2–3 hours before bedtime.Avoid lying down right after you eat.Do not exercise right after you eat.Lifestyle        Do not use any products that contain nicotine or tobacco, such as cigarettes, e-cigarettes, and chewing tobacco. If you need help quitting, ask your health care provider.Try to reduce your stress by using methods such as yoga or meditation. If you need help reducing stress, ask your health care provider.If you are overweight, reduce your weight to an amount that is healthy for you. Ask your health care provider for guidance about a safe weight loss goal.General instructions     Pay attention to any changes in your symptoms.Take over-the-counter and prescription medicines only as told by your health care provider. Do not take aspirin, ibuprofen, or other NSAIDs unless your health care provider told you to do so.Wear loose-fitting clothing. Do not wear anything tight around your waist that causes pressure on your abdomen.Raise (elevate) the head of your bed about 6 inches (15 cm).Avoid bending over if this makes your symptoms worse.Keep all follow-up visits as told by your health care provider. This is important.Contact a health care provider if:  You have:  New symptoms.Unexplained weight loss.Difficulty swallowing or it hurts to swallow.Wheezing or a persistent cough.A hoarse voice.Your symptoms do not improve with treatment.Get help right away if you:  Have pain in your arms, neck, jaw, teeth, or back.Feel sweaty, dizzy, or light-headed.Have chest pain or shortness of breath.Vomit and your vomit looks like blood or coffee grounds.Faint.Have stool that is bloody or black.Cannot swallow, drink, or eat.Summary  Gastroesophageal reflux happens when acid from the stomach flows up into the esophagus. GERD is a disease in which the reflux happens often, causes frequent or severe symptoms, or causes problems such as damage to the esophagus.Treatment for this condition may vary depending on how severe your symptoms are. Your health care provider may recommend diet and lifestyle changes, medicine, or surgery.Contact a health care provider if you have new or worsening symptoms.Take over-the-counter and prescription medicines only as told by your health care provider. Do not take aspirin, ibuprofen, or other NSAIDs unless your health care provider told you to do so.Keep all follow-up visits as told by your health care provider. This is important.

## 2020-05-15 NOTE — ED PROVIDER NOTE - OBJECTIVE STATEMENT
80 y/o F with h/o HTN (metoprolol, Norvasc), Anxiety (Xanax prn), GI ulcers (Nexium 40mg bid, Carafate, Zofran) pw exacerbation of acid reflux, belching and epigastric discomfort x 10 days. Follows GI Dr. Celeste Sagastume- last endoscopy was 3 years ago. negative for H. Pylori.  Denies shortness of breath, vomiting, diarrhea, blood in stool, fever, chills, weakness. Appears anxious on exam.  Denies smoking, ETOH history  Denies fam h/o cardiac disease  PMD- Dr. Stout

## 2020-05-15 NOTE — ED PROVIDER NOTE - PATIENT PORTAL LINK FT
You can access the FollowMyHealth Patient Portal offered by [facility] by registering at the following website: [email or web address]. By joining [facility]’s FollowMyHealth portal, you will also be able to view your health information using other applications (apps) compatible with our system.

## 2020-05-17 ENCOUNTER — EMERGENCY (EMERGENCY)
Facility: HOSPITAL | Age: 80
LOS: 1 days | Discharge: HOME | End: 2020-05-17
Attending: EMERGENCY MEDICINE | Admitting: EMERGENCY MEDICINE
Payer: MEDICARE

## 2020-05-17 VITALS
DIASTOLIC BLOOD PRESSURE: 72 MMHG | RESPIRATION RATE: 17 BRPM | SYSTOLIC BLOOD PRESSURE: 158 MMHG | OXYGEN SATURATION: 98 % | HEART RATE: 84 BPM

## 2020-05-17 VITALS
TEMPERATURE: 99 F | SYSTOLIC BLOOD PRESSURE: 168 MMHG | DIASTOLIC BLOOD PRESSURE: 83 MMHG | WEIGHT: 149.91 LBS | RESPIRATION RATE: 18 BRPM | HEIGHT: 62 IN | HEART RATE: 88 BPM | OXYGEN SATURATION: 99 %

## 2020-05-17 DIAGNOSIS — Z90.89 ACQUIRED ABSENCE OF OTHER ORGANS: Chronic | ICD-10-CM

## 2020-05-17 DIAGNOSIS — R10.9 UNSPECIFIED ABDOMINAL PAIN: ICD-10-CM

## 2020-05-17 DIAGNOSIS — Z98.890 OTHER SPECIFIED POSTPROCEDURAL STATES: Chronic | ICD-10-CM

## 2020-05-17 LAB
ALBUMIN SERPL ELPH-MCNC: 3.7 G/DL — SIGNIFICANT CHANGE UP (ref 3.3–5)
ALP SERPL-CCNC: 66 U/L — SIGNIFICANT CHANGE UP (ref 40–120)
ALT FLD-CCNC: 20 U/L — SIGNIFICANT CHANGE UP (ref 10–45)
ANION GAP SERPL CALC-SCNC: 6 MMOL/L — SIGNIFICANT CHANGE UP (ref 5–17)
AST SERPL-CCNC: 22 U/L — SIGNIFICANT CHANGE UP (ref 10–40)
BASOPHILS # BLD AUTO: 0.03 K/UL — SIGNIFICANT CHANGE UP (ref 0–0.2)
BASOPHILS NFR BLD AUTO: 0.4 % — SIGNIFICANT CHANGE UP (ref 0–2)
BILIRUB SERPL-MCNC: 0.6 MG/DL — SIGNIFICANT CHANGE UP (ref 0.2–1.2)
BUN SERPL-MCNC: 7 MG/DL — SIGNIFICANT CHANGE UP (ref 7–23)
CALCIUM SERPL-MCNC: 9.5 MG/DL — SIGNIFICANT CHANGE UP (ref 8.4–10.5)
CHLORIDE SERPL-SCNC: 105 MMOL/L — SIGNIFICANT CHANGE UP (ref 96–108)
CO2 SERPL-SCNC: 28 MMOL/L — SIGNIFICANT CHANGE UP (ref 22–31)
CREAT SERPL-MCNC: 0.75 MG/DL — SIGNIFICANT CHANGE UP (ref 0.5–1.3)
EOSINOPHIL # BLD AUTO: 0.06 K/UL — SIGNIFICANT CHANGE UP (ref 0–0.5)
EOSINOPHIL NFR BLD AUTO: 0.9 % — SIGNIFICANT CHANGE UP (ref 0–6)
GLUCOSE SERPL-MCNC: 102 MG/DL — HIGH (ref 70–99)
HCT VFR BLD CALC: 41.2 % — SIGNIFICANT CHANGE UP (ref 34.5–45)
HGB BLD-MCNC: 13.6 G/DL — SIGNIFICANT CHANGE UP (ref 11.5–15.5)
IMM GRANULOCYTES NFR BLD AUTO: 0.3 % — SIGNIFICANT CHANGE UP (ref 0–1.5)
LIDOCAIN IGE QN: 107 U/L — SIGNIFICANT CHANGE UP (ref 73–393)
LYMPHOCYTES # BLD AUTO: 1.88 K/UL — SIGNIFICANT CHANGE UP (ref 1–3.3)
LYMPHOCYTES # BLD AUTO: 27.6 % — SIGNIFICANT CHANGE UP (ref 13–44)
MAGNESIUM SERPL-MCNC: 4 MG/DL — HIGH (ref 1.6–2.6)
MCHC RBC-ENTMCNC: 30.4 PG — SIGNIFICANT CHANGE UP (ref 27–34)
MCHC RBC-ENTMCNC: 33 GM/DL — SIGNIFICANT CHANGE UP (ref 32–36)
MCV RBC AUTO: 92 FL — SIGNIFICANT CHANGE UP (ref 80–100)
MONOCYTES # BLD AUTO: 0.53 K/UL — SIGNIFICANT CHANGE UP (ref 0–0.9)
MONOCYTES NFR BLD AUTO: 7.8 % — SIGNIFICANT CHANGE UP (ref 2–14)
NEUTROPHILS # BLD AUTO: 4.28 K/UL — SIGNIFICANT CHANGE UP (ref 1.8–7.4)
NEUTROPHILS NFR BLD AUTO: 63 % — SIGNIFICANT CHANGE UP (ref 43–77)
NRBC # BLD: 0 /100 WBCS — SIGNIFICANT CHANGE UP (ref 0–0)
PLATELET # BLD AUTO: 225 K/UL — SIGNIFICANT CHANGE UP (ref 150–400)
POTASSIUM SERPL-MCNC: 3.8 MMOL/L — SIGNIFICANT CHANGE UP (ref 3.5–5.3)
POTASSIUM SERPL-SCNC: 3.8 MMOL/L — SIGNIFICANT CHANGE UP (ref 3.5–5.3)
PROT SERPL-MCNC: 7 G/DL — SIGNIFICANT CHANGE UP (ref 6–8.3)
RBC # BLD: 4.48 M/UL — SIGNIFICANT CHANGE UP (ref 3.8–5.2)
RBC # FLD: 12.3 % — SIGNIFICANT CHANGE UP (ref 10.3–14.5)
SODIUM SERPL-SCNC: 139 MMOL/L — SIGNIFICANT CHANGE UP (ref 135–145)
WBC # BLD: 6.8 K/UL — SIGNIFICANT CHANGE UP (ref 3.8–10.5)
WBC # FLD AUTO: 6.8 K/UL — SIGNIFICANT CHANGE UP (ref 3.8–10.5)

## 2020-05-17 PROCEDURE — 93010 ELECTROCARDIOGRAM REPORT: CPT

## 2020-05-17 PROCEDURE — 74018 RADEX ABDOMEN 1 VIEW: CPT

## 2020-05-17 PROCEDURE — 83690 ASSAY OF LIPASE: CPT

## 2020-05-17 PROCEDURE — 99284 EMERGENCY DEPT VISIT MOD MDM: CPT

## 2020-05-17 PROCEDURE — 74018 RADEX ABDOMEN 1 VIEW: CPT | Mod: 26

## 2020-05-17 PROCEDURE — 83735 ASSAY OF MAGNESIUM: CPT

## 2020-05-17 PROCEDURE — 80053 COMPREHEN METABOLIC PANEL: CPT

## 2020-05-17 PROCEDURE — 99284 EMERGENCY DEPT VISIT MOD MDM: CPT | Mod: 25

## 2020-05-17 PROCEDURE — 93005 ELECTROCARDIOGRAM TRACING: CPT

## 2020-05-17 PROCEDURE — 96374 THER/PROPH/DIAG INJ IV PUSH: CPT

## 2020-05-17 PROCEDURE — 85027 COMPLETE CBC AUTOMATED: CPT

## 2020-05-17 RX ORDER — SODIUM CHLORIDE 9 MG/ML
1000 INJECTION INTRAMUSCULAR; INTRAVENOUS; SUBCUTANEOUS ONCE
Refills: 0 | Status: COMPLETED | OUTPATIENT
Start: 2020-05-17 | End: 2020-05-17

## 2020-05-17 RX ORDER — DIAZEPAM 5 MG
5 TABLET ORAL ONCE
Refills: 0 | Status: DISCONTINUED | OUTPATIENT
Start: 2020-05-17 | End: 2020-05-17

## 2020-05-17 RX ORDER — PANTOPRAZOLE SODIUM 20 MG/1
40 TABLET, DELAYED RELEASE ORAL ONCE
Refills: 0 | Status: COMPLETED | OUTPATIENT
Start: 2020-05-17 | End: 2020-05-17

## 2020-05-17 RX ADMIN — PANTOPRAZOLE SODIUM 40 MILLIGRAM(S): 20 TABLET, DELAYED RELEASE ORAL at 15:36

## 2020-05-17 RX ADMIN — Medication 5 MILLIGRAM(S): at 15:36

## 2020-05-17 RX ADMIN — SODIUM CHLORIDE 1000 MILLILITER(S): 9 INJECTION INTRAMUSCULAR; INTRAVENOUS; SUBCUTANEOUS at 15:37

## 2020-05-17 NOTE — ED PROVIDER NOTE - ATTENDING CONTRIBUTION TO CARE
Dr. Cedeno: I performed a face to face bedside interview with patient regarding history of present illness, review of symptoms and past medical history. I completed an independent physical exam.  I have discussed patient's plan of care with PA.   I agree with note as stated above, having amended the EMR as needed to reflect my findings.   This includes HISTORY OF PRESENT ILLNESS, HIV, PAST MEDICAL/SURGICAL/FAMILY/SOCIAL HISTORY, ALLERGIES AND HOME MEDICATIONS, REVIEW OF SYSTEMS, PHYSICAL EXAM, and any PROGRESS NOTES during the time I functioned as the attending physician for this patient.    see mdm

## 2020-05-17 NOTE — ED PROVIDER NOTE - OBJECTIVE STATEMENT
78 yo female with h/o HTN, GERD/PUD presents to the ED c/o epigastric burning with radiation into chest x 1 week. Pain is constant, worse with eating. Patient was in ED 2 days ago, had labs, ekg, cxr - unremarkable and discharged home with pepcid. Associated with nausea and belching, no vomiting or diarrhea. Patient unable to get an appointment with her GI doctor (Dr. Celeste Sagastume). Last endoscopy 3 years ago. + h/o c/s, no additional abdominal surgery. Denies fever, chills, sob, vomiting, diarrhea, urinary sxs, flank pain. Patient tolerating PO but decreased.

## 2020-05-17 NOTE — ED PROVIDER NOTE - CLINICAL SUMMARY MEDICAL DECISION MAKING FREE TEXT BOX
Dr. Cedeno: 79F h/o HTN, GERD, PUD, supposed to have an EGD but hasn't yet, p/w 1 week of epigastric burning and belching which feels exactly like her GERD. Seen at OSH 2 days ago for the same, had normal labs including trop, returned here because she wants a stat EGD. Pt also c/o feeling anxious. No chest pain or sob. +nausea, no vomiting, no diarrhea. No abdo pain. No fever. On exam pt is anxious appearing but nad, rrr, ctab, abdo soft/nt/nd, no cvat. Likely GERD, will check lytes, tx symptomatically, outpatient GI f/u

## 2020-05-17 NOTE — ED PROVIDER NOTE - CARE PROVIDER_API CALL
LUCIO HANNA  GASTROENTEROLOGY  [address] [place] [address]  Phone: [phone]  Fax: [phone]  Follow Up Time:

## 2020-05-17 NOTE — ED PROVIDER NOTE - PROGRESS NOTE DETAILS
Patient feeling significantly better after meds, pending lab and xrays. Reevaluated patient at bedside.  Patient feeling much improved.  Discussed the results of all diagnostic testing in ED and copies of all available reports given.   An opportunity to ask questions was provided.  Discussed the importance of prompt, close medical follow-up.  Patient will return with any changes, concerns or persistent/worsening symptoms.  Understanding of all instructions verbalized.

## 2020-05-17 NOTE — ED ADULT TRIAGE NOTE - HEIGHT IN INCHES
Patient informed Rx printed and ready to be picked up at our office.  
Written rx for levothyroxine 137 mcg 90 day supply    
2

## 2020-05-17 NOTE — ED PROVIDER NOTE - PATIENT PORTAL LINK FT
You can access the FollowMyHealth Patient Portal offered by [facility] by registering at the following website: [email or web address]. By joining [facility]’s FollowMyHealth portal, you will also be able to view your health information using other applications (apps) compatible with our system.

## 2020-05-17 NOTE — ED ADULT NURSE NOTE - NSFALLRSKASSESASSIST_ED_ALL_ED
Attending Only no I have personally performed a face to face diagnostic evaluation on this patient. I have reviewed the ACP note and agree with the history, exam and plan of care, except as noted.

## 2020-05-21 ENCOUNTER — EMERGENCY (EMERGENCY)
Facility: HOSPITAL | Age: 80
LOS: 1 days | Discharge: HOME | End: 2020-05-21
Attending: EMERGENCY MEDICINE
Payer: MEDICARE

## 2020-05-21 VITALS
DIASTOLIC BLOOD PRESSURE: 66 MMHG | SYSTOLIC BLOOD PRESSURE: 148 MMHG | OXYGEN SATURATION: 99 % | RESPIRATION RATE: 18 BRPM | HEART RATE: 89 BPM

## 2020-05-21 VITALS
SYSTOLIC BLOOD PRESSURE: 180 MMHG | WEIGHT: 147.93 LBS | RESPIRATION RATE: 20 BRPM | HEIGHT: 62 IN | DIASTOLIC BLOOD PRESSURE: 76 MMHG | TEMPERATURE: 98 F | HEART RATE: 114 BPM | OXYGEN SATURATION: 100 %

## 2020-05-21 DIAGNOSIS — Z98.890 OTHER SPECIFIED POSTPROCEDURAL STATES: Chronic | ICD-10-CM

## 2020-05-21 DIAGNOSIS — Z90.89 ACQUIRED ABSENCE OF OTHER ORGANS: Chronic | ICD-10-CM

## 2020-05-21 LAB
ALBUMIN SERPL ELPH-MCNC: 4.4 G/DL — SIGNIFICANT CHANGE UP (ref 3.3–5)
ALP SERPL-CCNC: 70 U/L — SIGNIFICANT CHANGE UP (ref 40–120)
ALT FLD-CCNC: 20 U/L — SIGNIFICANT CHANGE UP (ref 10–45)
ANION GAP SERPL CALC-SCNC: 13 MMOL/L — SIGNIFICANT CHANGE UP (ref 5–17)
APTT BLD: 28.6 SEC — SIGNIFICANT CHANGE UP (ref 27.5–36.3)
AST SERPL-CCNC: 22 U/L — SIGNIFICANT CHANGE UP (ref 10–40)
BILIRUB SERPL-MCNC: 0.8 MG/DL — SIGNIFICANT CHANGE UP (ref 0.2–1.2)
BUN SERPL-MCNC: 12 MG/DL — SIGNIFICANT CHANGE UP (ref 7–23)
CALCIUM SERPL-MCNC: 9.6 MG/DL — SIGNIFICANT CHANGE UP (ref 8.4–10.5)
CHLORIDE SERPL-SCNC: 104 MMOL/L — SIGNIFICANT CHANGE UP (ref 96–108)
CO2 SERPL-SCNC: 24 MMOL/L — SIGNIFICANT CHANGE UP (ref 22–31)
CREAT SERPL-MCNC: 0.66 MG/DL — SIGNIFICANT CHANGE UP (ref 0.5–1.3)
GLUCOSE SERPL-MCNC: 113 MG/DL — HIGH (ref 70–99)
HCT VFR BLD CALC: 43.7 % — SIGNIFICANT CHANGE UP (ref 34.5–45)
HGB BLD-MCNC: 14.2 G/DL — SIGNIFICANT CHANGE UP (ref 11.5–15.5)
INR BLD: 1.06 RATIO — SIGNIFICANT CHANGE UP (ref 0.88–1.16)
LIDOCAIN IGE QN: 33 U/L — SIGNIFICANT CHANGE UP (ref 7–60)
MCHC RBC-ENTMCNC: 29.8 PG — SIGNIFICANT CHANGE UP (ref 27–34)
MCHC RBC-ENTMCNC: 32.5 GM/DL — SIGNIFICANT CHANGE UP (ref 32–36)
MCV RBC AUTO: 91.8 FL — SIGNIFICANT CHANGE UP (ref 80–100)
NRBC # BLD: 0 /100 WBCS — SIGNIFICANT CHANGE UP (ref 0–0)
PLATELET # BLD AUTO: 224 K/UL — SIGNIFICANT CHANGE UP (ref 150–400)
POTASSIUM SERPL-MCNC: 3.3 MMOL/L — LOW (ref 3.5–5.3)
POTASSIUM SERPL-SCNC: 3.3 MMOL/L — LOW (ref 3.5–5.3)
PROT SERPL-MCNC: 7.1 G/DL — SIGNIFICANT CHANGE UP (ref 6–8.3)
PROTHROM AB SERPL-ACNC: 12.1 SEC — SIGNIFICANT CHANGE UP (ref 10–12.9)
RBC # BLD: 4.76 M/UL — SIGNIFICANT CHANGE UP (ref 3.8–5.2)
RBC # FLD: 12.2 % — SIGNIFICANT CHANGE UP (ref 10.3–14.5)
SODIUM SERPL-SCNC: 141 MMOL/L — SIGNIFICANT CHANGE UP (ref 135–145)
TROPONIN T, HIGH SENSITIVITY RESULT: 7 NG/L — SIGNIFICANT CHANGE UP (ref 0–51)
TROPONIN T, HIGH SENSITIVITY RESULT: 9 NG/L — SIGNIFICANT CHANGE UP (ref 0–51)
WBC # BLD: 9.24 K/UL — SIGNIFICANT CHANGE UP (ref 3.8–10.5)
WBC # FLD AUTO: 9.24 K/UL — SIGNIFICANT CHANGE UP (ref 3.8–10.5)

## 2020-05-21 PROCEDURE — 99285 EMERGENCY DEPT VISIT HI MDM: CPT | Mod: CS,GC

## 2020-05-21 RX ORDER — METOCLOPRAMIDE HCL 10 MG
10 TABLET ORAL ONCE
Refills: 0 | Status: COMPLETED | OUTPATIENT
Start: 2020-05-21 | End: 2020-05-21

## 2020-05-21 RX ORDER — METOCLOPRAMIDE HCL 10 MG
1 TABLET ORAL
Qty: 5 | Refills: 0
Start: 2020-05-21

## 2020-05-21 RX ORDER — SODIUM CHLORIDE 9 MG/ML
1000 INJECTION INTRAMUSCULAR; INTRAVENOUS; SUBCUTANEOUS ONCE
Refills: 0 | Status: COMPLETED | OUTPATIENT
Start: 2020-05-21 | End: 2020-05-21

## 2020-05-21 RX ORDER — FAMOTIDINE 10 MG/ML
20 INJECTION INTRAVENOUS ONCE
Refills: 0 | Status: COMPLETED | OUTPATIENT
Start: 2020-05-21 | End: 2020-05-21

## 2020-05-21 RX ADMIN — FAMOTIDINE 20 MILLIGRAM(S): 10 INJECTION INTRAVENOUS at 15:21

## 2020-05-21 RX ADMIN — Medication 1 MILLIGRAM(S): at 15:22

## 2020-05-21 RX ADMIN — SODIUM CHLORIDE 1000 MILLILITER(S): 9 INJECTION INTRAMUSCULAR; INTRAVENOUS; SUBCUTANEOUS at 15:22

## 2020-05-21 RX ADMIN — Medication 10 MILLIGRAM(S): at 15:22

## 2020-05-21 NOTE — ED PROVIDER NOTE - PHYSICAL EXAMINATION
General: alert, conversant, looks uncomfortable but not toxic, no active vomiting, vitals reassuring  Head: atraumatic, normocephalic  Eyes: PERRL, EOMI, no scleral icterus  ENT: no epistaxis, moist mucous membranes, normal phonation, airway patent  Neck: full ROM, no midline ttp  CV: RRR, no murmurs, HDS  Pulm: lungs CTA b/l, no wheezing, no respiratory distress  GI: abd soft, non tender, no guarding/rebound/masses  Back: normal ROM, no midline ttp, no signs of trauma  Extremities: normal ROM, joints stable, distal pulses intact, no edema  Neuro: alert, oriented x3, moving all extremities, interactive, normal speech/memory/gait  Derm: warm, dry, normal color, no rash/wounds

## 2020-05-21 NOTE — ED PROVIDER NOTE - NSFOLLOWUPINSTRUCTIONS_ED_ALL_ED_FT
You are likely having symptoms from gastritis as you have had before.    Follow up with your doctor next week as planned.    Take the reglan twice daily.    Take the ativan twice daily but as needed. Do not combine with xanax, drink, or drive on this medication.    Please return to ER for new or concerning symptoms.

## 2020-05-21 NOTE — ED PROVIDER NOTE - PROGRESS NOTE DETAILS
Haverty, PGY2- lab work unremarkable, feeling much better, has f/u appt in 6 days, will d/c with short course of ERX for ativan/reglan, stable for d/c, pt understands and amenable to plan

## 2020-05-21 NOTE — ED PROVIDER NOTE - OBJECTIVE STATEMENT
79 yoM, PMHx of mild treated HTN, GERD, PUD returns to ED after 2 prior visits for similar, epigastric pain/vomiting. Has been scoped several time in past, last 3 years ago. Never had serious upper/lower GIB. Feels nauseated and pain with every oral intake. Also feels very anxious because she is in too much pain to sleep or do any activities. Does not smoke, drink or use drugs. Has seen GI physician twice this week, Dr. Jhonatan Eid, who feels needs scope but unable to because of covid. Denies any diarrhea, SOB, cough, or back pain. No prior surgeries. 10 pound weight loss in last 3 weeks. Has tried maalox, PPI, pepcid, xanax, Carafate, and Pepto-Bismol.

## 2020-05-21 NOTE — ED PROVIDER NOTE - ATTENDING CONTRIBUTION TO CARE
I saw and evaluated patient with resident. I discussed H+P and MDM with resident. I agree with the statements made by the resident unless otherwise noted.    The care of this patient was in support of the [facility] countermeasures to Covid-19.

## 2020-05-21 NOTE — ED PROVIDER NOTE - NS ED ROS FT
ROS:  Gen: fever ( - ) chills ( - ) weakness ( - )  Eyes: pain ( - ) visual changes ( - ) discharge ( - )  ENT: epistaxis ( - ) odynophagia ( - ) hearing changes ( - )  CV: chest pain ( - ) palpitations ( - ) syncope ( - )  Resp: cough ( - ) SOB ( - ) hemoptysis ( - )  GI: abd pain ( + ) vomiting ( + ) diarrhea ( - )   : genital pain ( - ) dysuria ( - ) vag bleeding ( - )  Derm: rash ( - ) laceration ( - ) wound ( - )  MSK: back pain ( - ) neck pain ( - ) joint pain ( - ) myalgias ( - )  Neuro: seizures ( - ) headache ( - ) AMS ( - )  Endocrine: polydipsia ( - ) polyuria ( - ) weight change ( - )

## 2020-05-21 NOTE — ED PROVIDER NOTE - CLINICAL SUMMARY MEDICAL DECISION MAKING FREE TEXT BOX
Didi, PGY2- MDM: 79 yoF, PUD, GERD, HTN, 3rd visit for symptoms of likely gastritis/reflux, PUD this week, x2 at [place] ed, still with sx, saw GI but unable to obtain scope, oral medications not working, cannot eat  vitals reassuring, uncomfortable but not toxic, abd soft, no masses, not peritoneal  likely exacerbation of sx, not concerning for cardiopulmonary process, less likely surgical issues given normal abd exam  will send labs to asses for lytes, lipase, troponin but mostly hydrate and treat sx, will reeval after meds Didi, PGY2- MDM: 79 yoF, PUD, GERD, HTN, 3rd visit for symptoms of likely gastritis/reflux, PUD this week, x2 at [place] ed, still with sx, saw GI but unable to obtain scope, oral medications not working, cannot eat  vitals reassuring, uncomfortable but not toxic, abd soft, no masses, not peritoneal  likely exacerbation of sx, not concerning for cardiopulmonary process, less likely surgical issues given normal abd exam  will send labs to [facility] for lytes, lipase, troponin but mostly hydrate and treat sx, will reeval after avis George MD: Pt is a 80 y/o female with PMHx of mild treated HTN, GERD, PUD returns to ED after 2 prior visits for similar, epigastric pain/vomiting. Has been scoped several time in past, last 3 years ago. Never had serious upper/lower GIB. Feels nauseated and pain with every oral intake. Also feels very anxious because she is in too much pain to sleep or do any activities. Does not smoke, drink or use drugs. Has seen GI physician twice this week, Dr. Jhonatan Eid, who feels needs scope but unable to because of covid. Denies any diarrhea, SOB, cough, or back pain. No prior surgeries. 10 pound weight loss in last 3 weeks. Has tried maalox, PPI, pepcid, xanax, Carafate, and Pepto-Bismol. Ddx includes, however, is not limited to: GERD, gastritis, PUD, anxiety, ACS, other. Plan: basic labs, cardiac w/u, CXR, pain control, reassess for need for outpt vs. inpt further GI w/u

## 2020-05-23 ENCOUNTER — INPATIENT (INPATIENT)
Facility: HOSPITAL | Age: 80
LOS: 3 days | Discharge: HOME | DRG: 392 | End: 2020-05-27
Attending: INTERNAL MEDICINE | Admitting: INTERNAL MEDICINE
Payer: MEDICARE

## 2020-05-23 VITALS
OXYGEN SATURATION: 100 % | SYSTOLIC BLOOD PRESSURE: 187 MMHG | RESPIRATION RATE: 20 BRPM | HEART RATE: 94 BPM | HEIGHT: 61.5 IN | DIASTOLIC BLOOD PRESSURE: 92 MMHG | TEMPERATURE: 98 F | WEIGHT: 145.95 LBS

## 2020-05-23 DIAGNOSIS — R10.13 EPIGASTRIC PAIN: ICD-10-CM

## 2020-05-23 DIAGNOSIS — Z90.89 ACQUIRED ABSENCE OF OTHER ORGANS: Chronic | ICD-10-CM

## 2020-05-23 DIAGNOSIS — I10 ESSENTIAL (PRIMARY) HYPERTENSION: ICD-10-CM

## 2020-05-23 DIAGNOSIS — F41.9 ANXIETY DISORDER, UNSPECIFIED: ICD-10-CM

## 2020-05-23 DIAGNOSIS — Z98.890 OTHER SPECIFIED POSTPROCEDURAL STATES: Chronic | ICD-10-CM

## 2020-05-23 LAB
ALBUMIN SERPL ELPH-MCNC: 4.3 G/DL — SIGNIFICANT CHANGE UP (ref 3.3–5)
ALP SERPL-CCNC: 66 U/L — SIGNIFICANT CHANGE UP (ref 40–120)
ALT FLD-CCNC: 20 U/L — SIGNIFICANT CHANGE UP (ref 10–45)
ANION GAP SERPL CALC-SCNC: 12 MMOL/L — SIGNIFICANT CHANGE UP (ref 5–17)
AST SERPL-CCNC: 21 U/L — SIGNIFICANT CHANGE UP (ref 10–40)
BASE EXCESS BLDV CALC-SCNC: 2.3 MMOL/L — HIGH (ref -2–2)
BASE EXCESS BLDV CALC-SCNC: 2.9 MMOL/L — HIGH (ref -2–2)
BASOPHILS # BLD AUTO: 0.03 K/UL — SIGNIFICANT CHANGE UP (ref 0–0.2)
BASOPHILS NFR BLD AUTO: 0.5 % — SIGNIFICANT CHANGE UP (ref 0–2)
BILIRUB SERPL-MCNC: 0.6 MG/DL — SIGNIFICANT CHANGE UP (ref 0.2–1.2)
BUN SERPL-MCNC: 7 MG/DL — SIGNIFICANT CHANGE UP (ref 7–23)
CA-I SERPL-SCNC: 1.19 MMOL/L — SIGNIFICANT CHANGE UP (ref 1.12–1.3)
CA-I SERPL-SCNC: 1.2 MMOL/L — SIGNIFICANT CHANGE UP (ref 1.12–1.3)
CALCIUM SERPL-MCNC: 9.4 MG/DL — SIGNIFICANT CHANGE UP (ref 8.4–10.5)
CHLORIDE BLDV-SCNC: 108 MMOL/L — SIGNIFICANT CHANGE UP (ref 96–108)
CHLORIDE BLDV-SCNC: 108 MMOL/L — SIGNIFICANT CHANGE UP (ref 96–108)
CHLORIDE SERPL-SCNC: 103 MMOL/L — SIGNIFICANT CHANGE UP (ref 96–108)
CO2 BLDV-SCNC: 29 MMOL/L — SIGNIFICANT CHANGE UP (ref 22–30)
CO2 BLDV-SCNC: 30 MMOL/L — SIGNIFICANT CHANGE UP (ref 22–30)
CO2 SERPL-SCNC: 24 MMOL/L — SIGNIFICANT CHANGE UP (ref 22–31)
CREAT SERPL-MCNC: 0.61 MG/DL — SIGNIFICANT CHANGE UP (ref 0.5–1.3)
EOSINOPHIL # BLD AUTO: 0.03 K/UL — SIGNIFICANT CHANGE UP (ref 0–0.5)
EOSINOPHIL NFR BLD AUTO: 0.5 % — SIGNIFICANT CHANGE UP (ref 0–6)
GAS PNL BLDV: 141 MMOL/L — SIGNIFICANT CHANGE UP (ref 135–145)
GAS PNL BLDV: 142 MMOL/L — SIGNIFICANT CHANGE UP (ref 135–145)
GAS PNL BLDV: SIGNIFICANT CHANGE UP
GLUCOSE BLDV-MCNC: 103 MG/DL — HIGH (ref 70–99)
GLUCOSE BLDV-MCNC: 109 MG/DL — HIGH (ref 70–99)
GLUCOSE SERPL-MCNC: 105 MG/DL — HIGH (ref 70–99)
HCO3 BLDV-SCNC: 28 MMOL/L — SIGNIFICANT CHANGE UP (ref 21–29)
HCO3 BLDV-SCNC: 28 MMOL/L — SIGNIFICANT CHANGE UP (ref 21–29)
HCT VFR BLD CALC: 44.6 % — SIGNIFICANT CHANGE UP (ref 34.5–45)
HCT VFR BLDA CALC: 44 % — SIGNIFICANT CHANGE UP (ref 39–50)
HCT VFR BLDA CALC: 45 % — SIGNIFICANT CHANGE UP (ref 39–50)
HGB BLD CALC-MCNC: 14.2 G/DL — SIGNIFICANT CHANGE UP (ref 11.5–15.5)
HGB BLD CALC-MCNC: 14.6 G/DL — SIGNIFICANT CHANGE UP (ref 11.5–15.5)
HGB BLD-MCNC: 14.1 G/DL — SIGNIFICANT CHANGE UP (ref 11.5–15.5)
HOROWITZ INDEX BLDV+IHG-RTO: SIGNIFICANT CHANGE UP
IMM GRANULOCYTES NFR BLD AUTO: 0.3 % — SIGNIFICANT CHANGE UP (ref 0–1.5)
LACTATE BLDV-MCNC: 1.6 MMOL/L — SIGNIFICANT CHANGE UP (ref 0.7–2)
LACTATE BLDV-MCNC: 2.6 MMOL/L — HIGH (ref 0.7–2)
LIDOCAIN IGE QN: 38 U/L — SIGNIFICANT CHANGE UP (ref 7–60)
LYMPHOCYTES # BLD AUTO: 1.88 K/UL — SIGNIFICANT CHANGE UP (ref 1–3.3)
LYMPHOCYTES # BLD AUTO: 28.4 % — SIGNIFICANT CHANGE UP (ref 13–44)
MCHC RBC-ENTMCNC: 29.6 PG — SIGNIFICANT CHANGE UP (ref 27–34)
MCHC RBC-ENTMCNC: 31.6 GM/DL — LOW (ref 32–36)
MCV RBC AUTO: 93.7 FL — SIGNIFICANT CHANGE UP (ref 80–100)
MONOCYTES # BLD AUTO: 0.47 K/UL — SIGNIFICANT CHANGE UP (ref 0–0.9)
MONOCYTES NFR BLD AUTO: 7.1 % — SIGNIFICANT CHANGE UP (ref 2–14)
NEUTROPHILS # BLD AUTO: 4.18 K/UL — SIGNIFICANT CHANGE UP (ref 1.8–7.4)
NEUTROPHILS NFR BLD AUTO: 63.2 % — SIGNIFICANT CHANGE UP (ref 43–77)
NRBC # BLD: 0 /100 WBCS — SIGNIFICANT CHANGE UP (ref 0–0)
PCO2 BLDV: 48 MMHG — SIGNIFICANT CHANGE UP (ref 35–50)
PCO2 BLDV: 49 MMHG — SIGNIFICANT CHANGE UP (ref 35–50)
PH BLDV: 7.38 — SIGNIFICANT CHANGE UP (ref 7.35–7.45)
PH BLDV: 7.38 — SIGNIFICANT CHANGE UP (ref 7.35–7.45)
PLATELET # BLD AUTO: 232 K/UL — SIGNIFICANT CHANGE UP (ref 150–400)
PO2 BLDV: 25 MMHG — SIGNIFICANT CHANGE UP (ref 25–45)
PO2 BLDV: 27 MMHG — SIGNIFICANT CHANGE UP (ref 25–45)
POTASSIUM BLDV-SCNC: 3.3 MMOL/L — LOW (ref 3.5–5.3)
POTASSIUM BLDV-SCNC: 3.5 MMOL/L — SIGNIFICANT CHANGE UP (ref 3.5–5.3)
POTASSIUM SERPL-MCNC: 3.5 MMOL/L — SIGNIFICANT CHANGE UP (ref 3.5–5.3)
POTASSIUM SERPL-SCNC: 3.5 MMOL/L — SIGNIFICANT CHANGE UP (ref 3.5–5.3)
PROT SERPL-MCNC: 7.1 G/DL — SIGNIFICANT CHANGE UP (ref 6–8.3)
RBC # BLD: 4.76 M/UL — SIGNIFICANT CHANGE UP (ref 3.8–5.2)
RBC # FLD: 12.1 % — SIGNIFICANT CHANGE UP (ref 10.3–14.5)
SAO2 % BLDV: 40 % — LOW (ref 67–88)
SAO2 % BLDV: 45 % — LOW (ref 67–88)
SARS-COV-2 RNA SPEC QL NAA+PROBE: SIGNIFICANT CHANGE UP
SODIUM SERPL-SCNC: 139 MMOL/L — SIGNIFICANT CHANGE UP (ref 135–145)
TROPONIN T, HIGH SENSITIVITY RESULT: 7 NG/L — SIGNIFICANT CHANGE UP (ref 0–51)
TROPONIN T, HIGH SENSITIVITY RESULT: 9 NG/L — SIGNIFICANT CHANGE UP (ref 0–51)
WBC # BLD: 6.61 K/UL — SIGNIFICANT CHANGE UP (ref 3.8–10.5)
WBC # FLD AUTO: 6.61 K/UL — SIGNIFICANT CHANGE UP (ref 3.8–10.5)

## 2020-05-23 PROCEDURE — 71046 X-RAY EXAM CHEST 2 VIEWS: CPT | Mod: 26

## 2020-05-23 PROCEDURE — 74177 CT ABD & PELVIS W/CONTRAST: CPT | Mod: 26

## 2020-05-23 PROCEDURE — 99285 EMERGENCY DEPT VISIT HI MDM: CPT | Mod: CS,GC

## 2020-05-23 RX ORDER — ESOMEPRAZOLE MAGNESIUM 40 MG/1
40 CAPSULE, DELAYED RELEASE ORAL
Qty: 0 | Refills: 0 | DISCHARGE

## 2020-05-23 RX ORDER — PANTOPRAZOLE SODIUM 20 MG/1
40 TABLET, DELAYED RELEASE ORAL
Refills: 0 | Status: DISCONTINUED | OUTPATIENT
Start: 2020-05-23 | End: 2020-05-27

## 2020-05-23 RX ORDER — FAMOTIDINE 10 MG/ML
20 INJECTION INTRAVENOUS ONCE
Refills: 0 | Status: COMPLETED | OUTPATIENT
Start: 2020-05-23 | End: 2020-05-23

## 2020-05-23 RX ORDER — HALOPERIDOL DECANOATE 100 MG/ML
2.5 INJECTION INTRAMUSCULAR ONCE
Refills: 0 | Status: COMPLETED | OUTPATIENT
Start: 2020-05-23 | End: 2020-05-23

## 2020-05-23 RX ORDER — ALPRAZOLAM 0.25 MG
0.25 TABLET ORAL THREE TIMES A DAY
Refills: 0 | Status: DISCONTINUED | OUTPATIENT
Start: 2020-05-23 | End: 2020-05-27

## 2020-05-23 RX ORDER — SUCRALFATE 1 G
1 TABLET ORAL
Refills: 0 | Status: DISCONTINUED | OUTPATIENT
Start: 2020-05-23 | End: 2020-05-24

## 2020-05-23 RX ORDER — ATORVASTATIN CALCIUM 80 MG/1
40 TABLET, FILM COATED ORAL AT BEDTIME
Refills: 0 | Status: DISCONTINUED | OUTPATIENT
Start: 2020-05-23 | End: 2020-05-27

## 2020-05-23 RX ORDER — LIDOCAINE 4 G/100G
10 CREAM TOPICAL ONCE
Refills: 0 | Status: COMPLETED | OUTPATIENT
Start: 2020-05-23 | End: 2020-05-23

## 2020-05-23 RX ORDER — ENOXAPARIN SODIUM 100 MG/ML
40 INJECTION SUBCUTANEOUS DAILY
Refills: 0 | Status: DISCONTINUED | OUTPATIENT
Start: 2020-05-23 | End: 2020-05-27

## 2020-05-23 RX ORDER — ONDANSETRON 8 MG/1
0 TABLET, FILM COATED ORAL
Qty: 0 | Refills: 0 | DISCHARGE

## 2020-05-23 RX ORDER — METOPROLOL TARTRATE 50 MG
25 TABLET ORAL DAILY
Refills: 0 | Status: DISCONTINUED | OUTPATIENT
Start: 2020-05-23 | End: 2020-05-27

## 2020-05-23 RX ORDER — SUCRALFATE 1 G
1 TABLET ORAL
Qty: 0 | Refills: 0 | DISCHARGE

## 2020-05-23 RX ORDER — AMLODIPINE BESYLATE 2.5 MG/1
5 TABLET ORAL AT BEDTIME
Refills: 0 | Status: DISCONTINUED | OUTPATIENT
Start: 2020-05-23 | End: 2020-05-27

## 2020-05-23 RX ORDER — ONDANSETRON 8 MG/1
4 TABLET, FILM COATED ORAL ONCE
Refills: 0 | Status: COMPLETED | OUTPATIENT
Start: 2020-05-23 | End: 2020-05-23

## 2020-05-23 RX ORDER — ACETAMINOPHEN 500 MG
1000 TABLET ORAL ONCE
Refills: 0 | Status: COMPLETED | OUTPATIENT
Start: 2020-05-23 | End: 2020-05-23

## 2020-05-23 RX ORDER — SODIUM CHLORIDE 9 MG/ML
1000 INJECTION, SOLUTION INTRAVENOUS ONCE
Refills: 0 | Status: COMPLETED | OUTPATIENT
Start: 2020-05-23 | End: 2020-05-23

## 2020-05-23 RX ORDER — METOPROLOL TARTRATE 50 MG
0 TABLET ORAL
Qty: 0 | Refills: 0 | DISCHARGE

## 2020-05-23 RX ORDER — ALPRAZOLAM 0.25 MG
0 TABLET ORAL
Qty: 0 | Refills: 0 | DISCHARGE

## 2020-05-23 RX ORDER — ONDANSETRON 8 MG/1
4 TABLET, FILM COATED ORAL
Refills: 0 | Status: DISCONTINUED | OUTPATIENT
Start: 2020-05-23 | End: 2020-05-27

## 2020-05-23 RX ADMIN — SODIUM CHLORIDE 1000 MILLILITER(S): 9 INJECTION, SOLUTION INTRAVENOUS at 15:00

## 2020-05-23 RX ADMIN — Medication 0.25 MILLIGRAM(S): at 21:36

## 2020-05-23 RX ADMIN — LIDOCAINE 10 MILLILITER(S): 4 CREAM TOPICAL at 14:01

## 2020-05-23 RX ADMIN — Medication 400 MILLIGRAM(S): at 17:14

## 2020-05-23 RX ADMIN — FAMOTIDINE 20 MILLIGRAM(S): 10 INJECTION INTRAVENOUS at 14:02

## 2020-05-23 RX ADMIN — AMLODIPINE BESYLATE 5 MILLIGRAM(S): 2.5 TABLET ORAL at 21:36

## 2020-05-23 RX ADMIN — SODIUM CHLORIDE 1000 MILLILITER(S): 9 INJECTION, SOLUTION INTRAVENOUS at 14:01

## 2020-05-23 RX ADMIN — PANTOPRAZOLE SODIUM 40 MILLIGRAM(S): 20 TABLET, DELAYED RELEASE ORAL at 18:45

## 2020-05-23 RX ADMIN — ONDANSETRON 4 MILLIGRAM(S): 8 TABLET, FILM COATED ORAL at 14:01

## 2020-05-23 RX ADMIN — Medication 1000 MILLIGRAM(S): at 18:38

## 2020-05-23 RX ADMIN — HALOPERIDOL DECANOATE 2.5 MILLIGRAM(S): 100 INJECTION INTRAMUSCULAR at 15:22

## 2020-05-23 NOTE — H&P ADULT - NSICDXPASTSURGICALHX_GEN_ALL_CORE_FT
PAST SURGICAL HISTORY:  H/O repair of left rotator cuff 2012    History of tonsillectomy 10 y/o    S/P hysterectomy in 1980    S/P lumpectomy of breast left in 1985

## 2020-05-23 NOTE — ED PROVIDER NOTE - CLINICAL SUMMARY MEDICAL DECISION MAKING FREE TEXT BOX
Joseph Frankel PGY1: 80 yo with ho gastric ulcers presenting with epigastric pain x 1month. VSS. Patient looks well and is non toxic appearing. PE as above. Most likely ulcers vs gastritis. However given constant pain will also get CT AandP. will also get basics, vbg, trop, lipase, viscous lidocaine, IV fluids. If pain not controlled, may have to consider admission.

## 2020-05-23 NOTE — H&P ADULT - NSHPPOADEEPVENOUSTHROMB_GEN_A_CORE
pt with no urine output since 10 pm last night , pt very uncomfortable since last night , pt has been drinking well, belly distended firm , pt recently d/c after 14 days admission for pna, had chest tube, Vásquez removed on Thursday and had urine output since
no

## 2020-05-23 NOTE — CONSULT NOTE ADULT - ASSESSMENT
79 year old female with recurrent epigastric pain     Dyspepsia   PPI daily   Avoid NSAIDs   Anti-reflux lifestyle modifications   Plan for EGD on Tuesday   Please repeat COVD test tomorrow morning pending endoscopy   NPO Monday night   Advanced care planning was discussed with patient and family.  Advanced care planning forms were reviewed and discussed.  Risks, benefits and alternatives of gastroenterologic procedures were discussed in detail and all questions were answered.

## 2020-05-23 NOTE — H&P ADULT - NSICDXFAMILYHX_GEN_ALL_CORE_FT
FAMILY HISTORY:  Family history of hypertension in mother, also colon cancer  Family history of lung cancer, father -  age 61- lung ca

## 2020-05-23 NOTE — H&P ADULT - ASSESSMENT
80 yo F with HTN and ho gastric ulcers requiring hospitalization presenting with epigastric pain x 1 month. Has been evaluated 3 times in past month at ED for similar symptoms. Follows with Dr. Jhonatan Eid who she last saw on Tuesday and said that she needs scope but cannot get it because of COVID. Currently has epigastric pain, at times radiating to the back.  Pain is burning and is associated with belching. Endorses nausea but denes fever, v/d. States she has not been able to tolerate PO because of the pain.

## 2020-05-23 NOTE — H&P ADULT - NSHPREVIEWOFSYSTEMS_GEN_ALL_CORE
REVIEW OF SYSTEMS:    CONSTITUTIONAL: No weakness, fevers or chills  EYES/ENT: No visual changes;  No vertigo or throat pain   NECK: No pain or stiffness  RESPIRATORY: No cough, wheezing, hemoptysis; No shortness of breath  CARDIOVASCULAR: No chest pain or palpitations  GASTROINTESTINAL: +  epigastric pain. No nausea, vomiting, or hematemesis; No diarrhea or constipation. No melena or hematochezia.  GENITOURINARY: No dysuria, frequency or hematuria  NEUROLOGICAL: No numbness or weakness  SKIN: No itching, burning, rashes, or lesions   All other review of systems is negative unless indicated above.

## 2020-05-23 NOTE — ED ADULT NURSE NOTE - OBJECTIVE STATEMENT
78 y/o female c/o constant burning epigastric pain traveling up into chest x1 month, radiating to back x2 days. Seen in ER 2 days ago for similar symptoms, treat and release w/ unremarkable blood work. Also seen in ER May 15 and 17. "I have been here four times, I can't take the pain anymore". Pt reports this feeling like ulcers she has had in past, but much worse in pain level. Was hospitalized twice years ago for gastric and esophageal ulcers. +Nausea, frequent burping. Recent diarrhea but pt thinks it is from taking maalox. Has seen GI who has recommended different meds, none give much relief, was supposed to have endoscopy but d/t covid pandemic unable to have procedure. Denies dizziness, chest pain, SOB, fever, falls, numbness or tingling, weakness, syncope, bleeding, urinary symptoms. PMH of GERD, gastritic esophageal ulcer, HTN, breast ca, osteoarthritis. A&Ox4, breath sounds clear bilaterally, no edema, ambulatory and able to move all extremities. 80 y/o female c/o constant burning epigastric pain traveling up into chest x1 month, radiating to back x2 days. Seen in ER 2 days ago for similar symptoms, treat and release w/ unremarkable blood work. Also seen in ER May 15 and 17. "I have been here four times, I can't take the pain anymore". Pt reports this feeling like ulcers she has had in past, but much worse in pain level. Was hospitalized twice years ago for gastric and esophageal ulcers. +Nausea, frequent burping. Recent diarrhea but pt thinks it is from taking maalox. Has seen GI who has recommended different meds, none give much relief, was supposed to have endoscopy but d/t covid pandemic unable to have procedure. Denies dizziness, chest pain, SOB, fever, falls, numbness or tingling, weakness, syncope, bleeding, urinary symptoms. PMH of GERD, gastritic esophageal ulcer, HTN, breast ca, osteoarthritis. A&Ox4, breath sounds clear bilaterally, no edema, no abd distention but very tender to epigastric region, ambulatory and able to move all extremities.

## 2020-05-23 NOTE — H&P ADULT - NSICDXPASTMEDICALHX_GEN_ALL_CORE_FT
PAST MEDICAL HISTORY:  Basal cell carcinoma of face and chest - removed    Breast cancer in 1985- received radiation treatment    Degenerative disc disease, lumbar     Gastric ulcer 2010    GERD (gastroesophageal reflux disease)     HTN (hypertension)     Osteoarthritis of right knee     Sprain rotator cuff left, had surgery    Uterine fibroid S/P hysterectomy

## 2020-05-23 NOTE — ED PROVIDER NOTE - OBJECTIVE STATEMENT
78 yo F with HTN and ho gastric ulcers requiring hospitalization presenting with epigastric pain x 1 month. Has been evaluated 3 times in past month at ED for similar symptoms. Follows with Dr. Jhonatan Eid who she last saw on Tuesday and said that she needs scope but cannot get it because of COVID. Currently has epigastric pain, at times radiating to the back.  Pain is burning and is associated with belching. Endorses nausea but denes fever, v/d. States she has not been able to tolerate PO because of the pain. 78 yo F with HTN and ho gastric ulcers requiring hospitalization presenting with epigastric pain x 1 month. Has been evaluated 3 times in past month at ED for similar symptoms. Follows with Dr. Jhonatan Eid who she last saw on Tuesday and said that she needs scope but cannot get it because of COVID. Currently has epigastric pain, at times radiating to the back.  Pain is burning and is associated with belching. Endorses nausea but denes fever, v/d. States she has not been able to tolerate PO because of the pain.    Attendinyo female presents with epigastric pain for the past week.  states has severe pain every time she eats.  has had this before with her grastritis

## 2020-05-23 NOTE — H&P ADULT - PROBLEM SELECTOR PLAN 1
cw protonix BID  zofran and ativan PRN for nausea  GI consult  epigastric pain unresolved at home for many days , unable to control her pain   EGD as per GI

## 2020-05-23 NOTE — ED PROVIDER NOTE - PHYSICAL EXAMINATION
Gen: Alert and oriented. Lying comfortably in bed. Answering questions appropriately  HEENT: extra occular movements intact, no nasal discharge, mucous membranes moist  CV: Regular rate and rhythm, +S1/S2, no murmurs/rubs/gallops,   Resp: Clear to ausculation bilaterally, no wheezes/rhonchi/rales  GI: Abdomen soft non-distended, tender to palpation in epigastrium, no rebound or guarding, no masses  MSK: No open wounds, no bruising, no LE edema, Homans sign negative bl  Neuro: A&Ox4, following commands, moving all four extremities spontaneously  Psych: appropriate mood

## 2020-05-23 NOTE — ED ADULT TRIAGE NOTE - CHIEF COMPLAINT QUOTE
hx of peptic and esophageal ulcers 3 yrs ago. T&R in ER 2 days ago. Feels worse. difficulty eating. was eval by GI in office twice

## 2020-05-23 NOTE — H&P ADULT - NSHPLABSRESULTS_GEN_ALL_CORE
Lab Results:  CBC  CBC Full  -  ( 23 May 2020 14:23 )  WBC Count : 6.61 K/uL  RBC Count : 4.76 M/uL  Hemoglobin : 14.1 g/dL  Hematocrit : 44.6 %  Platelet Count - Automated : 232 K/uL  Mean Cell Volume : 93.7 fl  Mean Cell Hemoglobin : 29.6 pg  Mean Cell Hemoglobin Concentration : 31.6 gm/dL  Auto Neutrophil # : 4.18 K/uL  Auto Lymphocyte # : 1.88 K/uL  Auto Monocyte # : 0.47 K/uL  Auto Eosinophil # : 0.03 K/uL  Auto Basophil # : 0.03 K/uL  Auto Neutrophil % : 63.2 %  Auto Lymphocyte % : 28.4 %  Auto Monocyte % : 7.1 %  Auto Eosinophil % : 0.5 %  Auto Basophil % : 0.5 %    .		Differential:	[] Automated		[] Manual  Chemistry                        14.1   6.61  )-----------( 232      ( 23 May 2020 14:23 )             44.6     05-23    139  |  103  |  7   ----------------------------<  105<H>  3.5   |  24  |  0.61    Ca    9.4      23 May 2020 14:23    TPro  7.1  /  Alb  4.3  /  TBili  0.6  /  DBili  x   /  AST  21  /  ALT  20  /  AlkPhos  66  05-23    LIVER FUNCTIONS - ( 23 May 2020 14:23 )  Alb: 4.3 g/dL / Pro: 7.1 g/dL / ALK PHOS: 66 U/L / ALT: 20 U/L / AST: 21 U/L / GGT: x                     MICROBIOLOGY/CULTURES:      RADIOLOGY RESULTS: reviewed

## 2020-05-23 NOTE — ED PROVIDER NOTE - NS ED ROS FT
Gen: Denies fever, weight loss  CV: Denies chest pain, palpitations  HEENT: Denies neck pain, sore throat, eye discharge  Skin: Denies rash, erythema, color changes  Resp: Denies SOB, cough  Endo: Denies sensitivity to heat, cold, increased urination  GI: Denies constipation, vomiting  Msk: Denies back pain, LE swelling, extremity pain  : Denies dysuria, increased frequency  Neuro: Denies LOC, weakness, seizures  Psych: Denies hx of psych, hallucinations, SI

## 2020-05-23 NOTE — H&P ADULT - NSHPPHYSICALEXAM_GEN_ALL_CORE
General: WN/WD NAD  PERRLA  Neurology: A&Ox3, nonfocal, DOWNS x 4  Respiratory: CTA B/L  CV: RRR, S1S2, no murmurs, rubs or gallops  Abdominal: Soft, NT, ND +BS, Last BM  Extremities: No edema, + peripheral pulses  Skin Normal

## 2020-05-24 LAB
ALBUMIN SERPL ELPH-MCNC: 3.6 G/DL — SIGNIFICANT CHANGE UP (ref 3.3–5)
ALP SERPL-CCNC: 53 U/L — SIGNIFICANT CHANGE UP (ref 40–120)
ALT FLD-CCNC: 18 U/L — SIGNIFICANT CHANGE UP (ref 10–45)
ANION GAP SERPL CALC-SCNC: 10 MMOL/L — SIGNIFICANT CHANGE UP (ref 5–17)
AST SERPL-CCNC: 18 U/L — SIGNIFICANT CHANGE UP (ref 10–40)
BILIRUB SERPL-MCNC: 0.7 MG/DL — SIGNIFICANT CHANGE UP (ref 0.2–1.2)
BUN SERPL-MCNC: 7 MG/DL — SIGNIFICANT CHANGE UP (ref 7–23)
CALCIUM SERPL-MCNC: 9.2 MG/DL — SIGNIFICANT CHANGE UP (ref 8.4–10.5)
CHLORIDE SERPL-SCNC: 106 MMOL/L — SIGNIFICANT CHANGE UP (ref 96–108)
CK SERPL-CCNC: 49 U/L — SIGNIFICANT CHANGE UP (ref 25–170)
CO2 SERPL-SCNC: 25 MMOL/L — SIGNIFICANT CHANGE UP (ref 22–31)
CREAT SERPL-MCNC: 0.62 MG/DL — SIGNIFICANT CHANGE UP (ref 0.5–1.3)
GLUCOSE SERPL-MCNC: 96 MG/DL — SIGNIFICANT CHANGE UP (ref 70–99)
HCT VFR BLD CALC: 39 % — SIGNIFICANT CHANGE UP (ref 34.5–45)
HGB BLD-MCNC: 12.8 G/DL — SIGNIFICANT CHANGE UP (ref 11.5–15.5)
MCHC RBC-ENTMCNC: 30.3 PG — SIGNIFICANT CHANGE UP (ref 27–34)
MCHC RBC-ENTMCNC: 32.8 GM/DL — SIGNIFICANT CHANGE UP (ref 32–36)
MCV RBC AUTO: 92.2 FL — SIGNIFICANT CHANGE UP (ref 80–100)
NRBC # BLD: 0 /100 WBCS — SIGNIFICANT CHANGE UP (ref 0–0)
PLATELET # BLD AUTO: 178 K/UL — SIGNIFICANT CHANGE UP (ref 150–400)
POTASSIUM SERPL-MCNC: 3.6 MMOL/L — SIGNIFICANT CHANGE UP (ref 3.5–5.3)
POTASSIUM SERPL-SCNC: 3.6 MMOL/L — SIGNIFICANT CHANGE UP (ref 3.5–5.3)
PROT SERPL-MCNC: 5.9 G/DL — LOW (ref 6–8.3)
RBC # BLD: 4.23 M/UL — SIGNIFICANT CHANGE UP (ref 3.8–5.2)
RBC # FLD: 12.4 % — SIGNIFICANT CHANGE UP (ref 10.3–14.5)
SODIUM SERPL-SCNC: 141 MMOL/L — SIGNIFICANT CHANGE UP (ref 135–145)
WBC # BLD: 7.06 K/UL — SIGNIFICANT CHANGE UP (ref 3.8–10.5)
WBC # FLD AUTO: 7.06 K/UL — SIGNIFICANT CHANGE UP (ref 3.8–10.5)

## 2020-05-24 RX ORDER — SUCRALFATE 1 G
1 TABLET ORAL
Refills: 0 | Status: DISCONTINUED | OUTPATIENT
Start: 2020-05-24 | End: 2020-05-27

## 2020-05-24 RX ORDER — ACETAMINOPHEN 500 MG
1000 TABLET ORAL ONCE
Refills: 0 | Status: COMPLETED | OUTPATIENT
Start: 2020-05-24 | End: 2020-05-24

## 2020-05-24 RX ADMIN — Medication 0.25 MILLIGRAM(S): at 21:33

## 2020-05-24 RX ADMIN — Medication 400 MILLIGRAM(S): at 15:14

## 2020-05-24 RX ADMIN — Medication 1 GRAM(S): at 06:00

## 2020-05-24 RX ADMIN — Medication 25 MILLIGRAM(S): at 06:00

## 2020-05-24 RX ADMIN — Medication 1 GRAM(S): at 17:49

## 2020-05-24 RX ADMIN — PANTOPRAZOLE SODIUM 40 MILLIGRAM(S): 20 TABLET, DELAYED RELEASE ORAL at 06:00

## 2020-05-24 RX ADMIN — PANTOPRAZOLE SODIUM 40 MILLIGRAM(S): 20 TABLET, DELAYED RELEASE ORAL at 17:33

## 2020-05-24 RX ADMIN — ENOXAPARIN SODIUM 40 MILLIGRAM(S): 100 INJECTION SUBCUTANEOUS at 11:23

## 2020-05-24 RX ADMIN — AMLODIPINE BESYLATE 5 MILLIGRAM(S): 2.5 TABLET ORAL at 21:33

## 2020-05-24 NOTE — CONSULT NOTE ADULT - SUBJECTIVE AND OBJECTIVE BOX
CARDIOLOGY ATTENDING      HISTORY OF PRESENT ILLNESS: She is a pleasant 80 y/o female PMH HTN and PUD admitted with epigastric pain. Her baseline EKG is normal, and her enzymes are negative. Her GI attending is planning and EGD. Cardiology now called for clearance Her last echo and NST are reportedly normal by her cardiologist Dr. Quoc Reyes [phone]. She denies palpitations, angina nor syncope.      PAST MEDICAL & SURGICAL HISTORY:  Osteoarthritis of right knee  Uterine fibroid: S/P hysterectomy  Basal cell carcinoma: of face and chest - removed  Breast cancer: in - received radiation treatment  Gastric ulcer:   Sprain rotator cuff: left, had surgery  HTN (hypertension)  History of tonsillectomy: 8 y/o  H/O repair of left rotator cuff:   S/P lumpectomy of breast: left in   S/P hysterectomy: in         MEDICATIONS  (STANDING):  amLODIPine   Tablet 5 milliGRAM(s) Oral at bedtime  atorvastatin 40 milliGRAM(s) Oral at bedtime  enoxaparin Injectable 40 milliGRAM(s) SubCutaneous daily  metoprolol succinate ER 25 milliGRAM(s) Oral daily  pantoprazole  Injectable 40 milliGRAM(s) IV Push two times a day  sucralfate 1 Gram(s) Oral two times a day      Allergies    apples, pears peaches figs plums fruit skin      Itchy mouth and tongue (Other)  No Known Drug Allergies    Intolerances    codeine (Vomiting)  epinephrine (Other)      FAMILY HISTORY:  Family history of lung cancer: father -  age 61- lung ca  Family history of hypertension in mother: also colon cancer    Non-contributary for premature coronary disease or sudden cardiac death    SOCIAL HISTORY:    [x ] Non-smoker  [ ] Smoker  [ ] Alcohol      REVIEW OF SYSTEMS:  [ x]chest pain  [  ]shortness of breath  [  ]palpitations  [  ]syncope  [ ]near syncope [ ]upper extremity weakness   [ ] lower extremity weakness  [  ]diplopia  [  ]altered mental status   [  ]fevers  [ ]chills [ ]nausea  [ ]vomitting  [  ]dysphagia    [ ]abdominal pain  [ ]melena  [ ]BRBPR    [  ]epistaxis  [  ]rash    [ ]lower extremity edema        [x ] All others negative	  [ ] Unable to obtain    PHYSICAL EXAM:  T(C): 36.4 (20 @ 04:20), Max: 36.9 (20 @ 13:30)  HR: 70 (20 @ 04:20) (70 - 94)  BP: 138/74 (20 @ 04:20) (128/74 - 187/92)  RR: 16 (20 @ 04:20) (16 - 20)  SpO2: 98% (20 @ 04:20) (97% - 100%)  Wt(kg): --    Appearance: Normal	  HEENT:   Normal oral mucosa, PERRL, EOMI	  Lymphatic: No lymphadenopathy , no edema  Cardiovascular: Normal S1 S2, No JVD, No murmurs , Peripheral pulses palpable 2+ bilaterally  Respiratory: Lungs clear to auscultation, normal effort 	  Gastrointestinal:  Soft, Non-tender, + BS	  Skin: No rashes, No ecchymoses, No cyanosis, warm to touch  Musculoskeletal: Normal range of motion, normal strength  Psychiatry:  Mood & affect appropriate      TELEMETRY: 	    ECG:  	    Echo:  NST:  Cath:  	  	  LABS:	 	                          12.8   7.06  )-----------( 178      ( 24 May 2020 05:57 )             39.0         141  |  106  |  7   ----------------------------<  96  3.6   |  25  |  0.62    Ca    9.2      24 May 2020 05:57    TPro  5.9<L>  /  Alb  3.6  /  TBili  0.7  /  DBili  x   /  AST  18  /  ALT  18  /  AlkPhos  53      proBNP:   Lipid Profile:   HgA1c:   TSH:       A/P) She is a pleasant 80 y/o female PMH HTN and PUD admitted with epigastric pain. Her baseline EKG is normal, and her enzymes are negative. Her GI attending is planning and EGD. Cardiology now called for clearance Her last echo and NST are reportedly normal by her cardiologist Dr. Quoc Reyes [phone]. She denies palpitations, angina nor syncope.    -please obtain last echo and NST from Dr. Reyes's office  -f/u GI regarding EGD  -given RCRI score of 0 she is low risk for a low risk procedure  -no inpatient cardiac testing needed if recent echo and NST from Amy's office are unremarkable      Nestor Kessler M.D., [facility]  Cardiac Electrophysiology  [facility]  [address] [place] [address], [address] [place] [address]  [email or web address]    office [phone]  pager [phone]
78 yo F with HTN and ho gastric ulcers requiring hospitalization presenting with epigastric pain x 1 month. Has been evaluated 3 times in past month at ED for similar symptoms. Follows with Dr. Jhonatan Eid who she last saw on Tuesday and said that she needs scope but cannot get it because of COVID. Currently has epigastric pain, at times radiating to the back.  Pain is burning and is associated with belching. Endorses nausea but denies fever, v/d. States she has not been able to tolerate PO because of the pain.            REVIEW OF SYSTEMS  Constitutional:   No fever, no fatigue, no pallor, no night sweats, no weight loss.  HEENT:   No eye pain, no vision changes, no icterus, no mouth ulcers.  Respiratory:   No shortness of breath, no cough, no respiratory distress.   Cardiovascular:   No chest pain, no palpitations.   Gastrointestinal: + abdominal pain, no nausea, no vomiting , no diahrrea, no constipation, no hematochezia,no melena.  Skin:   No rashes, no jaundice, no eczema.   Musculoskeletal:   No joint pain, no swelling, no myalgia.   Neurologic:   No headache, no seizure, no weakness.   Genitourinary:   No dysuria, no decreased urine output.  Psychiatric:  No depression, no anxiety,   Endocrine:   No thyroid disease, no diabetes.  Heme/Lymphatic:   No anemia, no blood transfusions, no lymph node enlargement, no bleeding, no bruising.  ___________________________________________________________________________________________  Allergies    apples, pears peaches figs plums fruit skin      Itchy mouth and tongue (Other)  No Known Drug Allergies    Intolerances    codeine (Vomiting)  epinephrine (Other)    MEDICATIONS  (STANDING):  amLODIPine   Tablet 5 milliGRAM(s) Oral at bedtime  atorvastatin 40 milliGRAM(s) Oral at bedtime  enoxaparin Injectable 40 milliGRAM(s) SubCutaneous daily  metoprolol succinate ER 25 milliGRAM(s) Oral daily  pantoprazole  Injectable 40 milliGRAM(s) IV Push two times a day  sucralfate 1 Gram(s) Oral two times a day    MEDICATIONS  (PRN):  ALPRAZolam 0.25 milliGRAM(s) Oral three times a day PRN anxieety  LORazepam     Tablet 0.5 milliGRAM(s) Oral two times a day PRN Anxiety, nausea and vomiting  ondansetron    Tablet 4 milliGRAM(s) Oral two times a day PRN Nausea and/or Vomiting      PAST MEDICAL & SURGICAL HISTORY:  Osteoarthritis of right knee  Degenerative disc disease, lumbar  Uterine fibroid: S/P hysterectomy  Basal cell carcinoma: of face and chest - removed  Breast cancer: in - received radiation treatment  Gastric ulcer:   GERD (gastroesophageal reflux disease)  Sprain rotator cuff: left, had surgery  HTN (hypertension)  History of tonsillectomy: 10 y/o  H/O repair of left rotator cuff:   S/P lumpectomy of breast: left in   S/P hysterectomy: in     FAMILY HISTORY:  Family history of lung cancer: father -  age 61- lung ca  Family history of hypertension in mother: also colon cancer    Social History: No hsitory of : Tobacco use, IVDA, EToH  ______________________________________________________________________________________    PHYSICAL EXAM    Daily Height in cm: 156.21 (23 May 2020 18:50)    Daily   BMI: 27.1 ( @ 18:50)  Change in Weight:  Vital Signs Last 24 Hrs  T(C): 36.4 (24 May 2020 04:20), Max: 36.9 (23 May 2020 13:30)  T(F): 97.6 (24 May 2020 04:20), Max: 98.4 (23 May 2020 13:30)  HR: 70 (24 May 2020 04:20) (70 - 94)  BP: 138/74 (24 May 2020 04:20) (128/74 - 187/92)  BP(mean): --  RR: 16 (24 May 2020 04:20) (16 - 20)  SpO2: 98% (24 May 2020 04:20) (97% - 100%)    General:  Well developed, well nourished, alert and active, no pallor, NAD.  HEENT:    Normal appearance of conjunctiva, ears, nose, lips, oropharynx, and oral mucosa, anicteric.  Neck:  No masses, no asymmetry.  Lymph Nodes:  No lymphadenopathy.   Cardiovascular:  RRR normal S1/S2, no murmur.  Respiratory:  CTA B/L, normal respiratory effort.   Abdominal:   soft, no masses or tenderness, normoactive BS, NT/ND, no HSM.  Extremities:   No clubbing or cyanosis, normal capillary refill, no edema.   Skin:   No rash, jaundice, lesions, eczema.   Musculoskeletal:  No joint swelling, erythema or tenderness.   Neuro: No focal deficits.   Other:   _______________________________________________________________________________________________  Lab Results:                          12.8   7.06  )-----------( 178      ( 24 May 2020 05:57 )             39.0     05-    139  |  103  |  7   ----------------------------<  105<H>  3.5   |  24  |  0.61    Ca    9.4      23 May 2020 14:23    TPro  7.1  /  Alb  4.3  /  TBili  0.6  /  DBili  x   /  AST  21  /  ALT  20  /  AlkPhos  66  05-23    LIVER FUNCTIONS - ( 23 May 2020 14:23 )  Alb: 4.3 g/dL / Pro: 7.1 g/dL / ALK PHOS: 66 U/L / ALT: 20 U/L / AST: 21 U/L / GGT: x                   Stool Results:          RADIOLOGY RESULTS:    SURGICAL PATHOLOGY:

## 2020-05-24 NOTE — PROGRESS NOTE ADULT - SUBJECTIVE AND OBJECTIVE BOX
Subjective:  No new even s overnight   ___________________________________________________________________________________________  Allergies    apples, pears peaches figs plums fruit skin      Itchy mouth and tongue (Other)  No Known Drug Allergies    Intolerances    codeine (Vomiting)  epinephrine (Other)    MEDICATIONS  (STANDING):  amLODIPine   Tablet 5 milliGRAM(s) Oral at bedtime  atorvastatin 40 milliGRAM(s) Oral at bedtime  enoxaparin Injectable 40 milliGRAM(s) SubCutaneous daily  metoprolol succinate ER 25 milliGRAM(s) Oral daily  pantoprazole  Injectable 40 milliGRAM(s) IV Push two times a day  sucralfate 1 Gram(s) Oral two times a day    MEDICATIONS  (PRN):  ALPRAZolam 0.25 milliGRAM(s) Oral three times a day PRN anxieety  LORazepam     Tablet 0.5 milliGRAM(s) Oral two times a day PRN Anxiety, nausea and vomiting  ondansetron    Tablet 4 milliGRAM(s) Oral two times a day PRN Nausea and/or Vomiting      PAST MEDICAL & SURGICAL HISTORY:  Osteoarthritis of right knee  Degenerative disc disease, lumbar  Uterine fibroid: S/P hysterectomy  Basal cell carcinoma: of face and chest - removed  Breast cancer: in - received radiation treatment  Gastric ulcer:   GERD (gastroesophageal reflux disease)  Sprain rotator cuff: left, had surgery  HTN (hypertension)  History of tonsillectomy: 10 y/o  H/O repair of left rotator cuff:   S/P lumpectomy of breast: left in   S/P hysterectomy: in     FAMILY HISTORY:  Family history of lung cancer: father -  age 61- lung ca  Family history of hypertension in mother: also colon cancer    Social History: No hsitory of : Tobacco use, IVDA, EToH  ______________________________________________________________________________________    PHYSICAL EXAM    Daily Height in cm: 156.21 (23 May 2020 18:50)    Daily   BMI: 27.1 ( @ 18:50)  Change in Weight:  Vital Signs Last 24 Hrs  T(C): 36.4 (24 May 2020 04:20), Max: 36.9 (23 May 2020 13:30)  T(F): 97.6 (24 May 2020 04:20), Max: 98.4 (23 May 2020 13:30)  HR: 70 (24 May 2020 04:20) (70 - 94)  BP: 138/74 (24 May 2020 04:20) (128/74 - 187/92)  BP(mean): --  RR: 16 (24 May 2020 04:20) (16 - 20)  SpO2: 98% (24 May 2020 04:20) (97% - 100%)    General:  Well developed, well nourished, alert and active, no pallor, NAD.  HEENT:    Normal appearance of conjunctiva, ears, nose, lips, oropharynx, and oral mucosa, anicteric.  Neck:  No masses, no asymmetry.  Lymph Nodes:  No lymphadenopathy.   Cardiovascular:  RRR normal S1/S2, no murmur.  Respiratory:  CTA B/L, normal respiratory effort.   Abdominal:   soft, no masses or tenderness, normoactive BS, NT/ND, no HSM.  Extremities:   No clubbing or cyanosis, normal capillary refill, no edema.   Skin:   No rash, jaundice, lesions, eczema.   Musculoskeletal:  No joint swelling, erythema or tenderness.   Neuro: No focal deficits.   Other:   _______________________________________________________________________________________________  Lab Results:                          12.8   7.06  )-----------( 178      ( 24 May 2020 05:57 )             39.0     05-    139  |  103  |  7   ----------------------------<  105<H>  3.5   |  24  |  0.61    Ca    9.4      23 May 2020 14:23    TPro  7.1  /  Alb  4.3  /  TBili  0.6  /  DBili  x   /  AST  21  /  ALT  20  /  AlkPhos  66      LIVER FUNCTIONS - ( 23 May 2020 14:23 )  Alb: 4.3 g/dL / Pro: 7.1 g/dL / ALK PHOS: 66 U/L / ALT: 20 U/L / AST: 21 U/L / GGT: x                   Stool Results:          RADIOLOGY RESULTS:    SURGICAL PATHOLOGY:

## 2020-05-24 NOTE — PROGRESS NOTE ADULT - SUBJECTIVE AND OBJECTIVE BOX
Patient is a 79y old  Female who presents with a chief complaint of epigastric pain (24 May 2020 09:46)      INTERVAL HPI/OVERNIGHT EVENTS:  T(C): 36.8 (05-24-20 @ 13:38), Max: 36.8 (05-24-20 @ 13:38)  HR: 80 (05-24-20 @ 13:38) (70 - 82)  BP: 147/77 (05-24-20 @ 13:38) (128/74 - 156/68)  RR: 17 (05-24-20 @ 13:38) (16 - 18)  SpO2: 98% (05-24-20 @ 13:38) (97% - 99%)  Wt(kg): --  I&O's Summary    23 May 2020 07:01  -  24 May 2020 07:00  --------------------------------------------------------  IN: 120 mL / OUT: 0 mL / NET: 120 mL    24 May 2020 07:01  -  24 May 2020 15:37  --------------------------------------------------------  IN: 960 mL / OUT: 0 mL / NET: 960 mL        LABS:                        12.8   7.06  )-----------( 178      ( 24 May 2020 05:57 )             39.0     05-24    141  |  106  |  7   ----------------------------<  96  3.6   |  25  |  0.62    Ca    9.2      24 May 2020 05:57    TPro  5.9<L>  /  Alb  3.6  /  TBili  0.7  /  DBili  x   /  AST  18  /  ALT  18  /  AlkPhos  53  05-24        CAPILLARY BLOOD GLUCOSE                MEDICATIONS  (STANDING):  amLODIPine   Tablet 5 milliGRAM(s) Oral at bedtime  atorvastatin 40 milliGRAM(s) Oral at bedtime  enoxaparin Injectable 40 milliGRAM(s) SubCutaneous daily  metoprolol succinate ER 25 milliGRAM(s) Oral daily  pantoprazole  Injectable 40 milliGRAM(s) IV Push two times a day  sucralfate suspension 1 Gram(s) Oral two times a day    MEDICATIONS  (PRN):  ALPRAZolam 0.25 milliGRAM(s) Oral three times a day PRN anxieety  LORazepam     Tablet 0.5 milliGRAM(s) Oral two times a day PRN Anxiety, nausea and vomiting  ondansetron    Tablet 4 milliGRAM(s) Oral two times a day PRN Nausea and/or Vomiting          PHYSICAL EXAM:  GENERAL: NAD, well-groomed, well-developed  HEAD:  Atraumatic, Normocephalic  CHEST/LUNG: Clear to percussion bilaterally; No rales, rhonchi, wheezing, or rubs  HEART: Regular rate and rhythm; No murmurs, rubs, or gallops  ABDOMEN: Soft, Nontender, Nondistended; Bowel sounds present  EXTREMITIES:  2+ Peripheral Pulses, No clubbing, cyanosis, or edema  LYMPH: No lymphadenopathy noted  SKIN: No rashes or lesions    Care Discussed with Consultants/Other Providers [ ] YES  [ ] NO

## 2020-05-24 NOTE — PROGRESS NOTE ADULT - ASSESSMENT
78 yo F with HTN and ho gastric ulcers requiring hospitalization presenting with epigastric pain x 1 month. Has been evaluated 3 times in past month at ED for similar symptoms. Follows with Dr. Jhonatan Eid who she last saw on Tuesday and said that she needs scope but cannot get it because of COVID. Currently has epigastric pain, at times radiating to the back.  Pain is burning and is associated with belching. Endorses nausea but denes fever, v/d. States she has not been able to tolerate PO because of the pain.    Problem/Plan - 1:  ·  Problem: Epigastric pain.  Plan: cw protonix BID  zofran and ativan PRN for nausea  GI consult appreciated   epigastric pain unresolved at home for many days , unable to control her pain   EGD Tuesday as per GI.     Problem/Plan - 2:  ·  Problem: HTN (hypertension).  Plan: cw home meds  DASH diet.     Problem/Plan - 3:  ·  Problem: Anxiety.  Plan: Xanax PRN.

## 2020-05-24 NOTE — PROGRESS NOTE ADULT - ASSESSMENT
79 year old female with recurrent epigastric pain     Dyspepsia   PPI daily   Change Carafate to liquid   Avoid NSAIDs   Anti-reflux lifestyle modifications   Plan for EGD on Tuesday   Please repeat COVID test tomorrow morning pending endoscopy   NPO Monday night   Advanced care planning was discussed with patient and family.  Advanced care planning forms were reviewed and discussed.  Risks, benefits and alternatives of gastroenterologic procedures were discussed in detail and all questions were answered.

## 2020-05-25 LAB — SARS-COV-2 RNA SPEC QL NAA+PROBE: SIGNIFICANT CHANGE UP

## 2020-05-25 RX ADMIN — PANTOPRAZOLE SODIUM 40 MILLIGRAM(S): 20 TABLET, DELAYED RELEASE ORAL at 15:20

## 2020-05-25 RX ADMIN — ONDANSETRON 4 MILLIGRAM(S): 8 TABLET, FILM COATED ORAL at 15:19

## 2020-05-25 RX ADMIN — PANTOPRAZOLE SODIUM 40 MILLIGRAM(S): 20 TABLET, DELAYED RELEASE ORAL at 05:39

## 2020-05-25 RX ADMIN — ENOXAPARIN SODIUM 40 MILLIGRAM(S): 100 INJECTION SUBCUTANEOUS at 15:19

## 2020-05-25 RX ADMIN — Medication 1 GRAM(S): at 05:38

## 2020-05-25 RX ADMIN — AMLODIPINE BESYLATE 5 MILLIGRAM(S): 2.5 TABLET ORAL at 21:19

## 2020-05-25 RX ADMIN — Medication 25 MILLIGRAM(S): at 05:38

## 2020-05-25 RX ADMIN — Medication 30 MILLILITER(S): at 04:04

## 2020-05-25 NOTE — PROGRESS NOTE ADULT - SUBJECTIVE AND OBJECTIVE BOX
Patient is a 79y old  Female who presents with a chief complaint of epigastric pain (25 May 2020 12:40)      INTERVAL HPI/OVERNIGHT EVENTS:  T(C): 36.7 (05-25-20 @ 13:46), Max: 36.7 (05-24-20 @ 21:28)  HR: 75 (05-25-20 @ 13:46) (68 - 75)  BP: 132/81 (05-25-20 @ 13:46) (132/81 - 148/84)  RR: 18 (05-25-20 @ 13:46) (18 - 18)  SpO2: 99% (05-25-20 @ 13:46) (98% - 99%)  Wt(kg): --  I&O's Summary    24 May 2020 07:01  -  25 May 2020 07:00  --------------------------------------------------------  IN: 1370 mL / OUT: 0 mL / NET: 1370 mL        LABS:                        12.8   7.06  )-----------( 178      ( 24 May 2020 05:57 )             39.0     05-24    141  |  106  |  7   ----------------------------<  96  3.6   |  25  |  0.62    Ca    9.2      24 May 2020 05:57    TPro  5.9<L>  /  Alb  3.6  /  TBili  0.7  /  DBili  x   /  AST  18  /  ALT  18  /  AlkPhos  53  05-24        CAPILLARY BLOOD GLUCOSE                MEDICATIONS  (STANDING):  amLODIPine   Tablet 5 milliGRAM(s) Oral at bedtime  atorvastatin 40 milliGRAM(s) Oral at bedtime  enoxaparin Injectable 40 milliGRAM(s) SubCutaneous daily  metoprolol succinate ER 25 milliGRAM(s) Oral daily  pantoprazole  Injectable 40 milliGRAM(s) IV Push two times a day  sucralfate suspension 1 Gram(s) Oral two times a day    MEDICATIONS  (PRN):  ALPRAZolam 0.25 milliGRAM(s) Oral three times a day PRN anxieety  LORazepam     Tablet 0.5 milliGRAM(s) Oral two times a day PRN Anxiety, nausea and vomiting  ondansetron    Tablet 4 milliGRAM(s) Oral two times a day PRN Nausea and/or Vomiting          PHYSICAL EXAM:  GENERAL: NAD, well-groomed, well-developed  HEAD:  Atraumatic, Normocephalic  CHEST/LUNG: Clear to percussion bilaterally; No rales, rhonchi, wheezing, or rubs  HEART: Regular rate and rhythm; No murmurs, rubs, or gallops  ABDOMEN: Soft, Nontender, Nondistended; Bowel sounds present  EXTREMITIES:  2+ Peripheral Pulses, No clubbing, cyanosis, or edema  LYMPH: No lymphadenopathy noted  SKIN: No rashes or lesions    Care Discussed with Consultants/Other Providers [ ] YES  [ ] NO

## 2020-05-25 NOTE — PROGRESS NOTE ADULT - ATTENDING COMMENTS
CARDIOLOGY ATTENDING    Patient seen and examined. Agree with above. She is a pleasant 78 y/o female PMH HTN and PUD admitted with epigastric pain. Her baseline EKG is normal, and her enzymes are negative. Her GI attending is planning an EGD. Cardiology now called for clearance Her last echo and NST are reportedly normal by her cardiologist Dr. Quoc Reyes [phone]. She denies palpitations, angina nor syncope. No reports on file.    -please obtain last echo and NST from Dr. Reyes's office  -f/u GI regarding EGD  -given RCRI score of 0 she is low risk for a low risk procedure  -no inpatient cardiac testing needed if recent echo and NST from Amy's office are unremarkable

## 2020-05-25 NOTE — PROGRESS NOTE ADULT - SUBJECTIVE AND OBJECTIVE BOX
Patient still has epigastric pain, denies chest pain or shortness of breath.   Review of systems otherwise (-)  	    MEDICATIONS  (STANDING):  amLODIPine   Tablet 5 milliGRAM(s) Oral at bedtime  atorvastatin 40 milliGRAM(s) Oral at bedtime  enoxaparin Injectable 40 milliGRAM(s) SubCutaneous daily  metoprolol succinate ER 25 milliGRAM(s) Oral daily  pantoprazole  Injectable 40 milliGRAM(s) IV Push two times a day  sucralfate suspension 1 Gram(s) Oral two times a day      LABS:	 	                          12.8   7.06  )-----------( 178      ( 24 May 2020 05:57 )             39.0     Hemoglobin: 12.8 g/dL (05-24 @ 05:57)  Hemoglobin: 14.1 g/dL (05-23 @ 14:23)  Hemoglobin: 14.2 g/dL (05-21 @ 14:47)    05-24    141  |  106  |  7   ----------------------------<  96  3.6   |  25  |  0.62    Ca    9.2      24 May 2020 05:57    TPro  5.9<L>  /  Alb  3.6  /  TBili  0.7  /  DBili  x   /  AST  18  /  ALT  18  /  AlkPhos  53  05-24    Creatinine Trend: 0.62<--, 0.61<--, 0.66<--, 0.75<--, 0.78<--  COAGS:       proBNP:   Lipid Profile:   HgA1c:   TSH:     PHYSICAL EXAM:  T(C): 36.4 (05-25-20 @ 04:45), Max: 36.8 (05-24-20 @ 13:38)  HR: 68 (05-25-20 @ 04:45) (68 - 80)  BP: 134/80 (05-25-20 @ 04:45) (134/80 - 148/84)  RR: 18 (05-25-20 @ 04:45) (17 - 18)  SpO2: 98% (05-25-20 @ 04:45) (98% - 98%)  Wt(kg): --  I&O's Summary    24 May 2020 07:01  -  25 May 2020 07:00  --------------------------------------------------------  IN: 1370 mL / OUT: 0 mL / NET: 1370 mL      Gen: Appears well in NAD  HEENT:  (-)icterus (-)pallor  CV: N S1 S2 1/6 CALEB (+)2 Pulses B/l  Resp:  Clear to auscultation B/L, normal effort  GI: (+) BS Soft, NT, ND  Lymph:  (-)Edema, (-)obvious lymphadenopathy  Skin: Warm to touch, Normal turgor  Psych: Appropriate mood and affect      TELEMETRY: None	      ASSESSMENT/PLAN: She is a pleasant 80 y/o female PMH HTN and PUD admitted with epigastric pain. Her baseline EKG is normal, and her enzymes are negative. Her GI attending is planning and EGD. Cardiology now called for clearance Her last echo and NST are reportedly normal by her cardiologist Dr. Quoc Reyes [phone]. She denies palpitations, angina nor syncope.    -please obtain last echo and NST from Dr. Reyes's office  -f/u GI - EGD tomorrow  -given RCRI score of 0 she is low risk for a low risk procedure  -no inpatient cardiac testing needed if recent echo and NST from Amy's office are unremarkable  	  Aryan García PA-C  Pager: [phone]

## 2020-05-25 NOTE — PROGRESS NOTE ADULT - ASSESSMENT
79 year old female with recurrent epigastric pain     Dyspepsia   PPI daily   Change Carafate to liquid   Avoid NSAIDs   Anti-reflux lifestyle modifications   Plan for EGD on Tuesday   Please update COVID today   Please repeat COVID test tomorrow morning pending endoscopy   NPO Monday night   Advanced care planning was discussed with patient and family.  Advanced care planning forms were reviewed and discussed.  Risks, benefits and alternatives of gastroenterologic procedures were discussed in detail and all questions were answered.

## 2020-05-25 NOTE — PROGRESS NOTE ADULT - ASSESSMENT
80 yo F with HTN and ho gastric ulcers requiring hospitalization presenting with epigastric pain x 1 month. Has been evaluated 3 times in past month at ED for similar symptoms. Follows with Dr. Jhonatan Eid who she last saw on Tuesday and said that she needs scope but cannot get it because of COVID. Currently has epigastric pain, at times radiating to the back.  Pain is burning and is associated with belching. Endorses nausea but denes fever, v/d. States she has not been able to tolerate PO because of the pain.    Problem/Plan - 1:  ·  Problem: Epigastric pain.  Plan: cw protonix BID  zofran and ativan PRN for nausea  GI consult appreciated   epigastric pain unresolved at home for many days , unable to control her pain   EGD Tuesday as per GI.   check covid today and in am     Problem/Plan - 2:  ·  Problem: HTN (hypertension).  Plan: cw home meds  DASH diet.     Problem/Plan - 3:·  Problem: Anxiety.  Plan: Xanax PRN.

## 2020-05-26 ENCOUNTER — RESULT REVIEW (OUTPATIENT)
Age: 80
End: 2020-05-26

## 2020-05-26 LAB
ANION GAP SERPL CALC-SCNC: 8 MMOL/L — SIGNIFICANT CHANGE UP (ref 5–17)
BUN SERPL-MCNC: 9 MG/DL — SIGNIFICANT CHANGE UP (ref 7–23)
CALCIUM SERPL-MCNC: 8.8 MG/DL — SIGNIFICANT CHANGE UP (ref 8.4–10.5)
CHLORIDE SERPL-SCNC: 105 MMOL/L — SIGNIFICANT CHANGE UP (ref 96–108)
CO2 SERPL-SCNC: 28 MMOL/L — SIGNIFICANT CHANGE UP (ref 22–31)
CREAT SERPL-MCNC: 0.64 MG/DL — SIGNIFICANT CHANGE UP (ref 0.5–1.3)
GLUCOSE SERPL-MCNC: 106 MG/DL — HIGH (ref 70–99)
HCT VFR BLD CALC: 40.9 % — SIGNIFICANT CHANGE UP (ref 34.5–45)
HGB BLD-MCNC: 12.8 G/DL — SIGNIFICANT CHANGE UP (ref 11.5–15.5)
MCHC RBC-ENTMCNC: 29.2 PG — SIGNIFICANT CHANGE UP (ref 27–34)
MCHC RBC-ENTMCNC: 31.3 GM/DL — LOW (ref 32–36)
MCV RBC AUTO: 93.4 FL — SIGNIFICANT CHANGE UP (ref 80–100)
NRBC # BLD: 0 /100 WBCS — SIGNIFICANT CHANGE UP (ref 0–0)
PLATELET # BLD AUTO: 188 K/UL — SIGNIFICANT CHANGE UP (ref 150–400)
POTASSIUM SERPL-MCNC: 3.6 MMOL/L — SIGNIFICANT CHANGE UP (ref 3.5–5.3)
POTASSIUM SERPL-SCNC: 3.6 MMOL/L — SIGNIFICANT CHANGE UP (ref 3.5–5.3)
RBC # BLD: 4.38 M/UL — SIGNIFICANT CHANGE UP (ref 3.8–5.2)
RBC # FLD: 12.4 % — SIGNIFICANT CHANGE UP (ref 10.3–14.5)
SODIUM SERPL-SCNC: 141 MMOL/L — SIGNIFICANT CHANGE UP (ref 135–145)
WBC # BLD: 5.79 K/UL — SIGNIFICANT CHANGE UP (ref 3.8–10.5)
WBC # FLD AUTO: 5.79 K/UL — SIGNIFICANT CHANGE UP (ref 3.8–10.5)

## 2020-05-26 PROCEDURE — 88305 TISSUE EXAM BY PATHOLOGIST: CPT | Mod: 26

## 2020-05-26 PROCEDURE — 88312 SPECIAL STAINS GROUP 1: CPT | Mod: 26

## 2020-05-26 RX ORDER — ACETAMINOPHEN 500 MG
1000 TABLET ORAL ONCE
Refills: 0 | Status: COMPLETED | OUTPATIENT
Start: 2020-05-26 | End: 2020-05-26

## 2020-05-26 RX ADMIN — PANTOPRAZOLE SODIUM 40 MILLIGRAM(S): 20 TABLET, DELAYED RELEASE ORAL at 18:13

## 2020-05-26 RX ADMIN — Medication 400 MILLIGRAM(S): at 05:00

## 2020-05-26 RX ADMIN — Medication 1 GRAM(S): at 18:13

## 2020-05-26 RX ADMIN — Medication 25 MILLIGRAM(S): at 04:40

## 2020-05-26 RX ADMIN — PANTOPRAZOLE SODIUM 40 MILLIGRAM(S): 20 TABLET, DELAYED RELEASE ORAL at 04:40

## 2020-05-26 RX ADMIN — Medication 30 MILLILITER(S): at 02:33

## 2020-05-26 RX ADMIN — Medication 0.25 MILLIGRAM(S): at 22:58

## 2020-05-26 RX ADMIN — AMLODIPINE BESYLATE 5 MILLIGRAM(S): 2.5 TABLET ORAL at 22:59

## 2020-05-26 NOTE — PROGRESS NOTE ADULT - SUBJECTIVE AND OBJECTIVE BOX
Patient is a 79y old  Female who presents with a chief complaint of epigastric pain (26 May 2020 11:57)      INTERVAL HPI/OVERNIGHT EVENTS:  T(C): 36.7 (05-26-20 @ 15:02), Max: 36.7 (05-26-20 @ 15:02)  HR: 69 (05-26-20 @ 16:09) (69 - 78)  BP: 160/75 (05-26-20 @ 16:09) (137/72 - 173/76)  RR: 18 (05-26-20 @ 15:02) (18 - 18)  SpO2: 98% (05-26-20 @ 15:02) (98% - 99%)  Wt(kg): --  I&O's Summary    25 May 2020 07:01  -  26 May 2020 07:00  --------------------------------------------------------  IN: 400 mL / OUT: 0 mL / NET: 400 mL        LABS:                        12.8   5.79  )-----------( 188      ( 26 May 2020 07:09 )             40.9     05-26    141  |  105  |  9   ----------------------------<  106<H>  3.6   |  28  |  0.64    Ca    8.8      26 May 2020 07:08          CAPILLARY BLOOD GLUCOSE                MEDICATIONS  (STANDING):  amLODIPine   Tablet 5 milliGRAM(s) Oral at bedtime  atorvastatin 40 milliGRAM(s) Oral at bedtime  enoxaparin Injectable 40 milliGRAM(s) SubCutaneous daily  metoprolol succinate ER 25 milliGRAM(s) Oral daily  pantoprazole  Injectable 40 milliGRAM(s) IV Push two times a day  sucralfate suspension 1 Gram(s) Oral two times a day    MEDICATIONS  (PRN):  ALPRAZolam 0.25 milliGRAM(s) Oral three times a day PRN anxieety  LORazepam     Tablet 0.5 milliGRAM(s) Oral two times a day PRN Anxiety, nausea and vomiting  ondansetron    Tablet 4 milliGRAM(s) Oral two times a day PRN Nausea and/or Vomiting          PHYSICAL EXAM:  GENERAL: NAD, well-groomed, well-developed  HEAD:  Atraumatic, Normocephalic  CHEST/LUNG: Clear to percussion bilaterally; No rales, rhonchi, wheezing, or rubs  HEART: Regular rate and rhythm; No murmurs, rubs, or gallops  ABDOMEN: Soft, Nontender, Nondistended; Bowel sounds present  EXTREMITIES:  2+ Peripheral Pulses, No clubbing, cyanosis, or edema  LYMPH: No lymphadenopathy noted  SKIN: No rashes or lesions    Care Discussed with Consultants/Other Providers [ ] YES  [ ] NO

## 2020-05-26 NOTE — PROGRESS NOTE ADULT - SUBJECTIVE AND OBJECTIVE BOX
S: Still has epigastric pain, denies chest pain or shortness of breath.   Review of systems otherwise (-)  	    MEDICATIONS  (STANDING):  amLODIPine   Tablet 5 milliGRAM(s) Oral at bedtime  atorvastatin 40 milliGRAM(s) Oral at bedtime  enoxaparin Injectable 40 milliGRAM(s) SubCutaneous daily  metoprolol succinate ER 25 milliGRAM(s) Oral daily  pantoprazole  Injectable 40 milliGRAM(s) IV Push two times a day  sucralfate suspension 1 Gram(s) Oral two times a day    MEDICATIONS  (PRN):  ALPRAZolam 0.25 milliGRAM(s) Oral three times a day PRN anxieety  LORazepam     Tablet 0.5 milliGRAM(s) Oral two times a day PRN Anxiety, nausea and vomiting  ondansetron    Tablet 4 milliGRAM(s) Oral two times a day PRN Nausea and/or Vomiting      LABS:                            12.8   5.79  )-----------( 188      ( 26 May 2020 07:09 )             40.9     Hemoglobin: 12.8 g/dL (05-26 @ 07:09)  Hemoglobin: 12.8 g/dL (05-24 @ 05:57)  Hemoglobin: 14.1 g/dL (05-23 @ 14:23)  Hemoglobin: 14.2 g/dL (05-21 @ 14:47)    05-26    141  |  105  |  9   ----------------------------<  106<H>  3.6   |  28  |  0.64    Ca    8.8      26 May 2020 07:08      Creatinine Trend: 0.64<--, 0.62<--, 0.61<--, 0.66<--, 0.75<--, 0.78<--     CARDIAC MARKERS ( 24 May 2020 05:57 )  x     / x     / 49 U/L / x     / x              05-25-20 @ 07:01  -  05-26-20 @ 07:00  --------------------------------------------------------  IN: 400 mL / OUT: 0 mL / NET: 400 mL        PHYSICAL EXAM  Vital Signs Last 24 Hrs  T(C): 36.4 (26 May 2020 04:21), Max: 36.7 (25 May 2020 13:46)  T(F): 97.6 (26 May 2020 04:21), Max: 98 (25 May 2020 13:46)  HR: 70 (26 May 2020 04:21) (70 - 78)  BP: 137/72 (26 May 2020 04:21) (132/81 - 166/89)  BP(mean): --  RR: 18 (26 May 2020 04:21) (18 - 18)  SpO2: 99% (26 May 2020 04:21) (98% - 99%)      Gen: Appears well in NAD  HEENT:  (-)icterus (-)pallor  CV: N S1 S2 1/6 CALEB (+)2 Pulses B/l  Resp:  Clear to auscultation B/L, normal effort  GI: (+) BS Soft, NT, ND  Lymph:  (-)Edema, (-)obvious lymphadenopathy  Skin: Warm to touch, Normal turgor  Psych: Appropriate mood and affect      TELEMETRY: None	      ASSESSMENT/PLAN: She is a pleasant 80 y/o female PMH HTN and PUD admitted with epigastric pain. Her baseline EKG is normal, and her enzymes are negative. Her GI attending is planning and EGD. Cardiology now called for clearance Her last echo and NST are reportedly normal by her cardiologist Dr. Quoc Reyes [phone]. She denies palpitations, angina nor syncope.    -not in clinical HF and no unstable cardiac syndromes  -please obtain last echo and NST from Dr. Reyes's office  -f/u GI - EGD pending today  -given RCRI score of 0 she is low risk for a low risk procedure  -no inpatient cardiac testing needed if recent echo and NST from Amy's office are unremarkable  	  Aryan García PA-C  Pager: [phone]

## 2020-05-26 NOTE — PROGRESS NOTE ADULT - ASSESSMENT
Problem/Plan - 1:  ·  Problem: Epigastric pain.  Plan: cw protonix BID  zofran and ativan PRN for nausea  GI consult appreciated   epigastric pain unresolved at home for many days , unable to control her pain   EGD with gastritis     Problem/Plan - 2:  ·  Problem: HTN (hypertension).  Plan: cw home meds  DASH diet.     Problem/Plan - 3:·  Problem: Anxiety.  Plan: Xanax PRN.     dc planning

## 2020-05-27 ENCOUNTER — TRANSCRIPTION ENCOUNTER (OUTPATIENT)
Age: 80
End: 2020-05-27

## 2020-05-27 VITALS
RESPIRATION RATE: 18 BRPM | OXYGEN SATURATION: 97 % | DIASTOLIC BLOOD PRESSURE: 78 MMHG | TEMPERATURE: 98 F | HEART RATE: 78 BPM | SYSTOLIC BLOOD PRESSURE: 145 MMHG

## 2020-05-27 RX ORDER — SUCRALFATE 1 G
10 TABLET ORAL
Qty: 0 | Refills: 0 | DISCHARGE

## 2020-05-27 RX ORDER — ROSUVASTATIN CALCIUM 5 MG/1
1 TABLET ORAL
Qty: 0 | Refills: 0 | DISCHARGE

## 2020-05-27 RX ORDER — ONDANSETRON 8 MG/1
1 TABLET, FILM COATED ORAL
Qty: 0 | Refills: 0 | DISCHARGE

## 2020-05-27 RX ORDER — SUCRALFATE 1 G
10 TABLET ORAL
Qty: 420 | Refills: 0
Start: 2020-05-27 | End: 2020-06-09

## 2020-05-27 RX ORDER — AMLODIPINE BESYLATE 2.5 MG/1
1 TABLET ORAL
Qty: 0 | Refills: 0 | DISCHARGE

## 2020-05-27 RX ORDER — ESOMEPRAZOLE MAGNESIUM 40 MG/1
1 CAPSULE, DELAYED RELEASE ORAL
Qty: 0 | Refills: 0 | DISCHARGE

## 2020-05-27 RX ORDER — UBIDECARENONE 100 MG
1 CAPSULE ORAL
Qty: 0 | Refills: 0 | DISCHARGE

## 2020-05-27 RX ORDER — SUCRALFATE 1 G
10 TABLET ORAL
Qty: 900 | Refills: 0
Start: 2020-05-27 | End: 2020-06-25

## 2020-05-27 RX ORDER — ALPRAZOLAM 0.25 MG
1 TABLET ORAL
Qty: 0 | Refills: 0 | DISCHARGE

## 2020-05-27 RX ORDER — METOPROLOL TARTRATE 50 MG
1 TABLET ORAL
Qty: 0 | Refills: 0 | DISCHARGE

## 2020-05-27 RX ADMIN — Medication 1 GRAM(S): at 06:26

## 2020-05-27 RX ADMIN — PANTOPRAZOLE SODIUM 40 MILLIGRAM(S): 20 TABLET, DELAYED RELEASE ORAL at 06:26

## 2020-05-27 RX ADMIN — Medication 1 DROP(S): at 17:00

## 2020-05-27 RX ADMIN — Medication 1 GRAM(S): at 16:59

## 2020-05-27 RX ADMIN — PANTOPRAZOLE SODIUM 40 MILLIGRAM(S): 20 TABLET, DELAYED RELEASE ORAL at 16:58

## 2020-05-27 RX ADMIN — Medication 1 DROP(S): at 11:35

## 2020-05-27 RX ADMIN — Medication 25 MILLIGRAM(S): at 06:26

## 2020-05-27 NOTE — DISCHARGE NOTE NURSING/CASE MANAGEMENT/SOCIAL WORK - PATIENT PORTAL LINK FT
You can access the FollowMyHealth Patient Portal offered by [facility] by registering at the following website: [email or web address]. By joining [facility]’s FollowMyHealth portal, you will also be able to view your health information using other applications (apps) compatible with our system.

## 2020-05-27 NOTE — DISCHARGE NOTE PROVIDER - NSDCFUADDINST_GEN_ALL_CORE_FT
Follow up with your Primary Care Physician within 1-2 weeks to discuss recent hospitalization.  Follow up with your cardiologist- Dr. Quoc Reyes- as directed on last cardiology visit.

## 2020-05-27 NOTE — PROGRESS NOTE ADULT - ATTENDING COMMENTS
CARDIOLOGY ATTENDING    Patient seen and examined. Agree with above. No further inpatient cardiac workup needed. F/U with her cardiologist Dr. Quoc Reyes after discharge.

## 2020-05-27 NOTE — PROGRESS NOTE ADULT - SUBJECTIVE AND OBJECTIVE BOX
S: Still has epigastric pain, but denies chest pain or shortness of breath.   Review of systems otherwise (-)  	  MEDICATIONS  (STANDING):  amLODIPine   Tablet 5 milliGRAM(s) Oral at bedtime  artificial  tears Solution 1 Drop(s) Both EYES two times a day  atorvastatin 40 milliGRAM(s) Oral at bedtime  enoxaparin Injectable 40 milliGRAM(s) SubCutaneous daily  metoprolol succinate ER 25 milliGRAM(s) Oral daily  pantoprazole  Injectable 40 milliGRAM(s) IV Push two times a day  sucralfate suspension 1 Gram(s) Oral two times a day    MEDICATIONS  (PRN):  ALPRAZolam 0.25 milliGRAM(s) Oral three times a day PRN anxieety  LORazepam     Tablet 0.5 milliGRAM(s) Oral two times a day PRN Anxiety, nausea and vomiting  ondansetron    Tablet 4 milliGRAM(s) Oral two times a day PRN Nausea and/or Vomiting      LABS:                      12.8   5.79  )-----------( 188      ( 26 May 2020 07:09 )             40.9     141  |  105  |  9   ----------------------------<  106<H>  3.6   |  28  |  0.64    Ca    8.8      26 May 2020 07:08    Creatinine Trend: 0.64<--, 0.62<--, 0.61<--, 0.66<--, 0.75<--, 0.78<--     PHYSICAL EXAM  Vital Signs Last 24 Hrs  T(C): 36.5 (27 May 2020 08:36), Max: 36.8 (26 May 2020 21:20)  T(F): 97.7 (27 May 2020 08:36), Max: 98.2 (26 May 2020 21:20)  HR: 91 (27 May 2020 08:36) (69 - 91)  BP: 129/85 (27 May 2020 08:36) (112/51 - 173/76)  RR: 18 (27 May 2020 08:36) (18 - 18)  SpO2: 98% (27 May 2020 08:36) (98% - 100%)      Gen: Appears well in NAD  HEENT:  (-)icterus (-)pallor  CV: N S1 S2 1/6 CALEB (+)2 Pulses B/l  Resp:  Clear to auscultation B/L, normal effort  GI: (+) BS Soft, NT, ND  Lymph:  (-)Edema, (-)obvious lymphadenopathy  Skin: Warm to touch, Normal turgor  Psych: Appropriate mood and affect        TELEMETRY: None	      ASSESSMENT/PLAN: She is a pleasant 80 y/o female PMH HTN and PUD admitted with epigastric pain. Her baseline EKG is normal, and her enzymes are negative. Her last echo and NST are reportedly normal by her cardiologist Dr. Quoc Reyes [phone]. She denies palpitations, angina nor syncope.    -not in clinical HF and no unstable cardiac syndromes  -tolerated EGD from CV perspective  -DC planned today  -no further inpatient cardiac work up  -f/u Dr Reyes after DC

## 2020-05-27 NOTE — DISCHARGE NOTE PROVIDER - CARE PROVIDER_API CALL
Wilberto Chowdhury  HOSPICE/PALLIATIVE MEDICINE  [address] [place] [address]  Phone: [phone]  Fax: [phone]  Follow Up Time:

## 2020-05-27 NOTE — DISCHARGE NOTE PROVIDER - HOSPITAL COURSE
History of Present Illness:     80 yo F with HTN and ho gastric ulcers requiring hospitalization presenting with epigastric pain x 1 month. Has been evaluated 3 times in past month at ED for similar symptoms. Follows with Dr. Jhonatan Eid who she last saw on Tuesday and said that she needs scope but cannot get it because of COVID. Currently has epigastric pain, at times radiating to the back.  Pain is burning and is associated with belching. Endorses nausea but denes fever, v/d. States she has not been able to tolerate PO because of the pain.            Hospital course:    Patient admitted to [facility] for epigastric pain. Gastroenterology consulted and recommendations made. Home medications continued. Underwent EGD which showed gastritis.     Patient was discharged to home with instructions to follow up with cardiologist as outpatient.

## 2020-05-27 NOTE — DISCHARGE NOTE PROVIDER - NSDCACTIVITY_GEN_ALL_CORE
Do not drive or operate machinery/Stairs allowed/Walking - Outdoors allowed/Showering allowed/Do not make important decisions/Walking - Indoors allowed/No heavy lifting/straining

## 2020-05-27 NOTE — PROGRESS NOTE ADULT - REASON FOR ADMISSION
epigastric pain

## 2020-05-27 NOTE — DISCHARGE NOTE PROVIDER - NSDCMRMEDTOKEN_GEN_ALL_CORE_FT
amLODIPine 5 mg oral tablet: 1 tab(s) orally once a day (at bedtime)  Carafate 1 g/10 mL oral suspension: 10 milliliter(s) orally 3 times a day (with meals) MDD:30mL  Crestor 5 mg oral tablet: 1 tab(s) orally once a day  LORazepam 0.5 mg oral tablet: 1 tab(s) orally 2 times a day, As needed, Anxiety, nausea and vomiting  metoprolol succinate 25 mg oral tablet, extended release: 1 tab(s) orally once a day  NexIUM 40 mg oral delayed release capsule: 1 cap(s) orally 2 times a day  Reglan 10 mg oral tablet: 1 tab(s) orally 2 times a day   Xanax 0.25 mg oral tablet: 1 tab(s) orally 3 times a day, As Needed  Zofran 4 mg oral tablet: 1 tab(s) orally 2 times a day, As Needed

## 2020-05-28 LAB — SURGICAL PATHOLOGY STUDY: SIGNIFICANT CHANGE UP

## 2020-06-02 PROCEDURE — 85027 COMPLETE CBC AUTOMATED: CPT

## 2020-06-02 PROCEDURE — 82435 ASSAY OF BLOOD CHLORIDE: CPT

## 2020-06-02 PROCEDURE — 83605 ASSAY OF LACTIC ACID: CPT

## 2020-06-02 PROCEDURE — 83690 ASSAY OF LIPASE: CPT

## 2020-06-02 PROCEDURE — 82803 BLOOD GASES ANY COMBINATION: CPT

## 2020-06-02 PROCEDURE — 88305 TISSUE EXAM BY PATHOLOGIST: CPT

## 2020-06-02 PROCEDURE — 71046 X-RAY EXAM CHEST 2 VIEWS: CPT

## 2020-06-02 PROCEDURE — 80048 BASIC METABOLIC PNL TOTAL CA: CPT

## 2020-06-02 PROCEDURE — 85014 HEMATOCRIT: CPT

## 2020-06-02 PROCEDURE — 99284 EMERGENCY DEPT VISIT MOD MDM: CPT | Mod: 25

## 2020-06-02 PROCEDURE — 84295 ASSAY OF SERUM SODIUM: CPT

## 2020-06-02 PROCEDURE — 74177 CT ABD & PELVIS W/CONTRAST: CPT

## 2020-06-02 PROCEDURE — 96375 TX/PRO/DX INJ NEW DRUG ADDON: CPT

## 2020-06-02 PROCEDURE — 84484 ASSAY OF TROPONIN QUANT: CPT

## 2020-06-02 PROCEDURE — 84132 ASSAY OF SERUM POTASSIUM: CPT

## 2020-06-02 PROCEDURE — 96374 THER/PROPH/DIAG INJ IV PUSH: CPT

## 2020-06-02 PROCEDURE — 93005 ELECTROCARDIOGRAM TRACING: CPT

## 2020-06-02 PROCEDURE — 82550 ASSAY OF CK (CPK): CPT

## 2020-06-02 PROCEDURE — 85730 THROMBOPLASTIN TIME PARTIAL: CPT

## 2020-06-02 PROCEDURE — 88312 SPECIAL STAINS GROUP 1: CPT

## 2020-06-02 PROCEDURE — 82947 ASSAY GLUCOSE BLOOD QUANT: CPT

## 2020-06-02 PROCEDURE — 85610 PROTHROMBIN TIME: CPT

## 2020-06-02 PROCEDURE — 99285 EMERGENCY DEPT VISIT HI MDM: CPT | Mod: 25

## 2020-06-02 PROCEDURE — 96361 HYDRATE IV INFUSION ADD-ON: CPT

## 2020-06-02 PROCEDURE — 82330 ASSAY OF CALCIUM: CPT

## 2020-06-02 PROCEDURE — 80053 COMPREHEN METABOLIC PANEL: CPT

## 2020-11-04 ENCOUNTER — RESULT REVIEW (OUTPATIENT)
Age: 80
End: 2020-11-04

## 2020-11-04 ENCOUNTER — APPOINTMENT (OUTPATIENT)
Dept: ULTRASOUND IMAGING | Facility: IMAGING CENTER | Age: 80
End: 2020-11-04
Payer: MEDICARE

## 2020-11-04 ENCOUNTER — APPOINTMENT (OUTPATIENT)
Dept: MAMMOGRAPHY | Facility: IMAGING CENTER | Age: 80
End: 2020-11-04
Payer: MEDICARE

## 2020-11-04 ENCOUNTER — OUTPATIENT (OUTPATIENT)
Dept: OUTPATIENT SERVICES | Facility: HOSPITAL | Age: 80
LOS: 1 days | End: 2020-11-04
Payer: MEDICARE

## 2020-11-04 DIAGNOSIS — Z98.890 OTHER SPECIFIED POSTPROCEDURAL STATES: Chronic | ICD-10-CM

## 2020-11-04 DIAGNOSIS — Z00.8 ENCOUNTER FOR OTHER GENERAL EXAMINATION: ICD-10-CM

## 2020-11-04 DIAGNOSIS — Z90.89 ACQUIRED ABSENCE OF OTHER ORGANS: Chronic | ICD-10-CM

## 2020-11-04 PROCEDURE — 77067 SCR MAMMO BI INCL CAD: CPT

## 2020-11-04 PROCEDURE — 77063 BREAST TOMOSYNTHESIS BI: CPT | Mod: 26

## 2020-11-04 PROCEDURE — 77067 SCR MAMMO BI INCL CAD: CPT | Mod: 26

## 2020-11-04 PROCEDURE — 76641 ULTRASOUND BREAST COMPLETE: CPT

## 2020-11-04 PROCEDURE — 76641 ULTRASOUND BREAST COMPLETE: CPT | Mod: 26,50

## 2020-11-04 PROCEDURE — 77063 BREAST TOMOSYNTHESIS BI: CPT

## 2021-12-01 ENCOUNTER — RESULT REVIEW (OUTPATIENT)
Age: 81
End: 2021-12-01

## 2021-12-01 ENCOUNTER — OUTPATIENT (OUTPATIENT)
Dept: OUTPATIENT SERVICES | Facility: HOSPITAL | Age: 81
LOS: 1 days | End: 2021-12-01
Payer: MEDICARE

## 2021-12-01 ENCOUNTER — APPOINTMENT (OUTPATIENT)
Dept: ULTRASOUND IMAGING | Facility: IMAGING CENTER | Age: 81
End: 2021-12-01
Payer: MEDICARE

## 2021-12-01 ENCOUNTER — APPOINTMENT (OUTPATIENT)
Dept: MAMMOGRAPHY | Facility: IMAGING CENTER | Age: 81
End: 2021-12-01
Payer: MEDICARE

## 2021-12-01 DIAGNOSIS — Z90.89 ACQUIRED ABSENCE OF OTHER ORGANS: Chronic | ICD-10-CM

## 2021-12-01 DIAGNOSIS — Z98.890 OTHER SPECIFIED POSTPROCEDURAL STATES: Chronic | ICD-10-CM

## 2021-12-01 DIAGNOSIS — Z00.8 ENCOUNTER FOR OTHER GENERAL EXAMINATION: ICD-10-CM

## 2021-12-01 PROCEDURE — 76641 ULTRASOUND BREAST COMPLETE: CPT

## 2021-12-01 PROCEDURE — 77067 SCR MAMMO BI INCL CAD: CPT | Mod: 26

## 2021-12-01 PROCEDURE — 77062 BREAST TOMOSYNTHESIS BI: CPT | Mod: 26

## 2021-12-01 PROCEDURE — 77066 DX MAMMO INCL CAD BI: CPT | Mod: 26

## 2021-12-01 PROCEDURE — 76641 ULTRASOUND BREAST COMPLETE: CPT | Mod: 26,50

## 2021-12-01 PROCEDURE — 77063 BREAST TOMOSYNTHESIS BI: CPT

## 2021-12-01 PROCEDURE — 77063 BREAST TOMOSYNTHESIS BI: CPT | Mod: 26

## 2021-12-01 PROCEDURE — 77067 SCR MAMMO BI INCL CAD: CPT

## 2021-12-10 ENCOUNTER — RESULT REVIEW (OUTPATIENT)
Age: 81
End: 2021-12-10

## 2021-12-10 ENCOUNTER — OUTPATIENT (OUTPATIENT)
Dept: OUTPATIENT SERVICES | Facility: HOSPITAL | Age: 81
LOS: 1 days | End: 2021-12-10
Payer: MEDICARE

## 2021-12-10 DIAGNOSIS — Z90.89 ACQUIRED ABSENCE OF OTHER ORGANS: Chronic | ICD-10-CM

## 2021-12-10 DIAGNOSIS — Z98.890 OTHER SPECIFIED POSTPROCEDURAL STATES: Chronic | ICD-10-CM

## 2021-12-10 DIAGNOSIS — R92.8 OTHER ABNORMAL AND INCONCLUSIVE FINDINGS ON DIAGNOSTIC IMAGING OF BREAST: ICD-10-CM

## 2021-12-10 PROCEDURE — 88305 TISSUE EXAM BY PATHOLOGIST: CPT | Mod: 26

## 2021-12-10 PROCEDURE — 19081 BX BREAST 1ST LESION STRTCTC: CPT

## 2021-12-10 PROCEDURE — G0279: CPT

## 2021-12-10 PROCEDURE — 77065 DX MAMMO INCL CAD UNI: CPT

## 2021-12-10 PROCEDURE — 88341 IMHCHEM/IMCYTCHM EA ADD ANTB: CPT | Mod: 26

## 2021-12-10 PROCEDURE — 88342 IMHCHEM/IMCYTCHM 1ST ANTB: CPT

## 2021-12-10 PROCEDURE — A4648: CPT

## 2021-12-10 PROCEDURE — G0279: CPT | Mod: 26

## 2021-12-10 PROCEDURE — 88342 IMHCHEM/IMCYTCHM 1ST ANTB: CPT | Mod: 26

## 2021-12-10 PROCEDURE — 19081 BX BREAST 1ST LESION STRTCTC: CPT | Mod: LT

## 2021-12-10 PROCEDURE — 88305 TISSUE EXAM BY PATHOLOGIST: CPT

## 2021-12-10 PROCEDURE — 88341 IMHCHEM/IMCYTCHM EA ADD ANTB: CPT

## 2021-12-10 PROCEDURE — 77065 DX MAMMO INCL CAD UNI: CPT | Mod: 26,LT

## 2021-12-13 ENCOUNTER — RESULT REVIEW (OUTPATIENT)
Age: 81
End: 2021-12-13

## 2021-12-13 ENCOUNTER — APPOINTMENT (OUTPATIENT)
Dept: MAMMOGRAPHY | Facility: CLINIC | Age: 81
End: 2021-12-13
Payer: MEDICARE

## 2022-11-21 ENCOUNTER — OFFICE (OUTPATIENT)
Dept: URBAN - METROPOLITAN AREA CLINIC 93 | Facility: CLINIC | Age: 82
Setting detail: OPHTHALMOLOGY
End: 2022-11-21
Payer: MEDICARE

## 2022-11-21 DIAGNOSIS — H04.123: ICD-10-CM

## 2022-11-21 DIAGNOSIS — Z96.1: ICD-10-CM

## 2022-11-21 DIAGNOSIS — H16.223: ICD-10-CM

## 2022-11-21 DIAGNOSIS — L82.1: ICD-10-CM

## 2022-11-21 DIAGNOSIS — H02.89: ICD-10-CM

## 2022-11-21 PROCEDURE — 99213 OFFICE O/P EST LOW 20 MIN: CPT | Performed by: OPHTHALMOLOGY

## 2022-11-21 ASSESSMENT — KERATOMETRY
OS_K2POWER_DIOPTERS: 47.25
OS_K1POWER_DIOPTERS: 46.25
OD_AXISANGLE_DEGREES: 145
OD_K2POWER_DIOPTERS: 45.50
OS_AXISANGLE_DEGREES: 030
OD_K1POWER_DIOPTERS: 46.25

## 2022-11-21 ASSESSMENT — LID EXAM ASSESSMENTS
OS_MEIBOMITIS: 1+
OD_MEIBOMITIS: 1+

## 2022-11-21 ASSESSMENT — SPHEQUIV_DERIVED
OS_SPHEQUIV: -0.25
OD_SPHEQUIV: 0.625

## 2022-11-21 ASSESSMENT — CONFRONTATIONAL VISUAL FIELD TEST (CVF)
OS_FINDINGS: FULL
OD_FINDINGS: FULL

## 2022-11-21 ASSESSMENT — REFRACTION_AUTOREFRACTION
OS_SPHERE: +0.25
OS_CYLINDER: -1.00
OD_CYLINDER: -0.75
OS_AXIS: 104
OD_AXIS: 60
OD_SPHERE: +1.00

## 2022-11-21 ASSESSMENT — VISUAL ACUITY
OD_BCVA: 20/20-1
OS_BCVA: 20/25-2

## 2022-11-21 ASSESSMENT — AXIALLENGTH_DERIVED
OD_AL: 22.5264
OS_AL: 22.5442

## 2023-05-22 ENCOUNTER — OFFICE (OUTPATIENT)
Facility: LOCATION | Age: 83
Setting detail: OPHTHALMOLOGY
End: 2023-05-22
Payer: MEDICARE

## 2023-05-22 VITALS — WEIGHT: 153 LBS | HEIGHT: 61 IN | BODY MASS INDEX: 28.89 KG/M2

## 2023-05-22 DIAGNOSIS — H02.89: ICD-10-CM

## 2023-05-22 DIAGNOSIS — H35.033: ICD-10-CM

## 2023-05-22 DIAGNOSIS — H16.223: ICD-10-CM

## 2023-05-22 DIAGNOSIS — Z96.1: ICD-10-CM

## 2023-05-22 PROCEDURE — 92014 COMPRE OPH EXAM EST PT 1/>: CPT | Performed by: OPHTHALMOLOGY

## 2023-05-22 PROCEDURE — 0207T CLEAR EYELID GLAND W/HEAT: CPT | Performed by: OPHTHALMOLOGY

## 2023-05-22 ASSESSMENT — AXIALLENGTH_DERIVED
OS_AL: 22.5366
OD_AL: 22.389

## 2023-05-22 ASSESSMENT — REFRACTION_AUTOREFRACTION
OD_SPHERE: +1.75
OD_AXIS: 76
OS_CYLINDER: -0.75
OS_AXIS: 107
OS_SPHERE: +0.50
OD_CYLINDER: -1.00

## 2023-05-22 ASSESSMENT — CONFRONTATIONAL VISUAL FIELD TEST (CVF)
OS_FINDINGS: FULL
OD_FINDINGS: FULL

## 2023-05-22 ASSESSMENT — TEAR BREAK UP TIME (TBUT)
OD_TBUT: 5
OS_TBUT: 3

## 2023-05-22 ASSESSMENT — VISUAL ACUITY
OS_BCVA: 20/30-3
OD_BCVA: 20/20-2

## 2023-05-22 ASSESSMENT — LID EXAM ASSESSMENTS
OS_MEIBOMITIS: 1+
OD_MEIBOMITIS: 1+

## 2023-05-22 ASSESSMENT — SUPERFICIAL PUNCTATE KERATITIS (SPK): OS_SPK: 2+

## 2023-05-22 ASSESSMENT — KERATOMETRY
OS_K2POWER_DIOPTERS: 46.00
OD_K1POWER_DIOPTERS: 46.00
OD_AXISANGLE_DEGREES: 51
OS_K1POWER_DIOPTERS: 46.75
OS_AXISANGLE_DEGREES: 115
OD_K2POWER_DIOPTERS: 45.25

## 2023-05-22 ASSESSMENT — SPHEQUIV_DERIVED
OD_SPHEQUIV: 1.25
OS_SPHEQUIV: 0.125

## 2023-06-08 ENCOUNTER — OFFICE (OUTPATIENT)
Facility: LOCATION | Age: 83
Setting detail: OPHTHALMOLOGY
End: 2023-06-08

## 2023-06-08 DIAGNOSIS — H16.223: ICD-10-CM

## 2023-06-08 DIAGNOSIS — H02.89: ICD-10-CM

## 2023-06-08 PROCEDURE — LIPIFLOW LIPIFLOW: Performed by: OPHTHALMOLOGY

## 2023-06-08 ASSESSMENT — VISUAL ACUITY
OS_BCVA: 20/30-3
OD_BCVA: 20/20-2

## 2023-06-08 ASSESSMENT — AXIALLENGTH_DERIVED
OD_AL: 22.389
OS_AL: 22.5366

## 2023-06-08 ASSESSMENT — KERATOMETRY
OS_K2POWER_DIOPTERS: 46.00
OD_K2POWER_DIOPTERS: 45.25
OS_AXISANGLE_DEGREES: 115
OD_AXISANGLE_DEGREES: 51
OD_K1POWER_DIOPTERS: 46.00
OS_K1POWER_DIOPTERS: 46.75

## 2023-06-08 ASSESSMENT — SPHEQUIV_DERIVED
OD_SPHEQUIV: 1.25
OS_SPHEQUIV: 0.125

## 2023-06-08 ASSESSMENT — CONFRONTATIONAL VISUAL FIELD TEST (CVF)
OS_FINDINGS: FULL
OD_FINDINGS: FULL

## 2023-06-08 ASSESSMENT — LID EXAM ASSESSMENTS
OS_MEIBOMITIS: 1+
OD_MEIBOMITIS: 1+

## 2023-06-08 ASSESSMENT — REFRACTION_AUTOREFRACTION
OD_AXIS: 76
OS_CYLINDER: -0.75
OS_SPHERE: +0.50
OD_CYLINDER: -1.00
OD_SPHERE: +1.75
OS_AXIS: 107

## 2023-06-08 ASSESSMENT — SUPERFICIAL PUNCTATE KERATITIS (SPK): OS_SPK: 2+

## 2023-06-08 ASSESSMENT — TEAR BREAK UP TIME (TBUT)
OS_TBUT: 3
OD_TBUT: 5

## 2023-07-20 ENCOUNTER — OFFICE (OUTPATIENT)
Facility: LOCATION | Age: 83
Setting detail: OPHTHALMOLOGY
End: 2023-07-20
Payer: MEDICARE

## 2023-07-20 DIAGNOSIS — H16.223: ICD-10-CM

## 2023-07-20 DIAGNOSIS — H02.89: ICD-10-CM

## 2023-07-20 PROCEDURE — 99213 OFFICE O/P EST LOW 20 MIN: CPT | Performed by: OPHTHALMOLOGY

## 2023-07-20 ASSESSMENT — TONOMETRY
OD_IOP_MMHG: 12
OS_IOP_MMHG: 12

## 2023-07-20 ASSESSMENT — LID EXAM ASSESSMENTS
OS_MEIBOMITIS: T
OD_MEIBOMITIS: T

## 2023-07-20 ASSESSMENT — SUPERFICIAL PUNCTATE KERATITIS (SPK): OS_SPK: 2+

## 2023-07-20 ASSESSMENT — TEAR BREAK UP TIME (TBUT)
OS_TBUT: 3
OD_TBUT: 5

## 2023-07-20 ASSESSMENT — CONFRONTATIONAL VISUAL FIELD TEST (CVF)
OD_FINDINGS: FULL
OS_FINDINGS: FULL

## 2023-07-21 ASSESSMENT — REFRACTION_AUTOREFRACTION
OD_CYLINDER: -1.00
OD_AXIS: 76
OS_AXIS: 107
OS_SPHERE: +0.50
OS_CYLINDER: -0.75
OD_SPHERE: +1.75

## 2023-07-21 ASSESSMENT — KERATOMETRY
OD_K2POWER_DIOPTERS: 45.25
OD_AXISANGLE_DEGREES: 51
OS_AXISANGLE_DEGREES: 115
OS_K2POWER_DIOPTERS: 46.00
OS_K1POWER_DIOPTERS: 46.75
OD_K1POWER_DIOPTERS: 46.00

## 2023-07-21 ASSESSMENT — VISUAL ACUITY: OD_BCVA: 20/20-2

## 2023-07-21 ASSESSMENT — SPHEQUIV_DERIVED
OD_SPHEQUIV: 1.25
OS_SPHEQUIV: 0.125

## 2023-07-21 ASSESSMENT — AXIALLENGTH_DERIVED
OS_AL: 22.5366
OD_AL: 22.389

## 2023-11-12 ENCOUNTER — INPATIENT (INPATIENT)
Facility: HOSPITAL | Age: 83
LOS: 1 days | Discharge: HOME | DRG: 392 | End: 2023-11-14
Attending: INTERNAL MEDICINE | Admitting: INTERNAL MEDICINE
Payer: MEDICARE

## 2023-11-12 VITALS
RESPIRATION RATE: 18 BRPM | SYSTOLIC BLOOD PRESSURE: 182 MMHG | WEIGHT: 145.06 LBS | TEMPERATURE: 98 F | HEART RATE: 89 BPM | DIASTOLIC BLOOD PRESSURE: 82 MMHG | HEIGHT: 61 IN | OXYGEN SATURATION: 99 %

## 2023-11-12 DIAGNOSIS — Q61.02 CONGENITAL MULTIPLE RENAL CYSTS: ICD-10-CM

## 2023-11-12 DIAGNOSIS — Z29.9 ENCOUNTER FOR PROPHYLACTIC MEASURES, UNSPECIFIED: ICD-10-CM

## 2023-11-12 DIAGNOSIS — Z90.89 ACQUIRED ABSENCE OF OTHER ORGANS: Chronic | ICD-10-CM

## 2023-11-12 DIAGNOSIS — Z98.890 OTHER SPECIFIED POSTPROCEDURAL STATES: Chronic | ICD-10-CM

## 2023-11-12 DIAGNOSIS — K29.70 GASTRITIS, UNSPECIFIED, WITHOUT BLEEDING: ICD-10-CM

## 2023-11-12 DIAGNOSIS — R79.89 OTHER SPECIFIED ABNORMAL FINDINGS OF BLOOD CHEMISTRY: ICD-10-CM

## 2023-11-12 DIAGNOSIS — I24.9 ACUTE ISCHEMIC HEART DISEASE, UNSPECIFIED: ICD-10-CM

## 2023-11-12 LAB
ALBUMIN SERPL ELPH-MCNC: 3.5 G/DL — SIGNIFICANT CHANGE UP (ref 3.3–5)
ALBUMIN SERPL ELPH-MCNC: 3.5 G/DL — SIGNIFICANT CHANGE UP (ref 3.3–5)
ALP SERPL-CCNC: 67 U/L — SIGNIFICANT CHANGE UP (ref 40–120)
ALP SERPL-CCNC: 67 U/L — SIGNIFICANT CHANGE UP (ref 40–120)
ALT FLD-CCNC: 24 U/L — SIGNIFICANT CHANGE UP (ref 10–45)
ALT FLD-CCNC: 24 U/L — SIGNIFICANT CHANGE UP (ref 10–45)
ANION GAP SERPL CALC-SCNC: 8 MMOL/L — SIGNIFICANT CHANGE UP (ref 5–17)
ANION GAP SERPL CALC-SCNC: 8 MMOL/L — SIGNIFICANT CHANGE UP (ref 5–17)
APPEARANCE UR: CLEAR — SIGNIFICANT CHANGE UP
APPEARANCE UR: CLEAR — SIGNIFICANT CHANGE UP
APTT BLD: 30.9 SEC — SIGNIFICANT CHANGE UP (ref 24.5–35.6)
APTT BLD: 30.9 SEC — SIGNIFICANT CHANGE UP (ref 24.5–35.6)
AST SERPL-CCNC: 16 U/L — SIGNIFICANT CHANGE UP (ref 10–40)
AST SERPL-CCNC: 16 U/L — SIGNIFICANT CHANGE UP (ref 10–40)
BACTERIA # UR AUTO: NEGATIVE /HPF — SIGNIFICANT CHANGE UP
BACTERIA # UR AUTO: NEGATIVE /HPF — SIGNIFICANT CHANGE UP
BASOPHILS # BLD AUTO: 0.03 K/UL — SIGNIFICANT CHANGE UP (ref 0–0.2)
BASOPHILS # BLD AUTO: 0.03 K/UL — SIGNIFICANT CHANGE UP (ref 0–0.2)
BASOPHILS NFR BLD AUTO: 0.5 % — SIGNIFICANT CHANGE UP (ref 0–2)
BASOPHILS NFR BLD AUTO: 0.5 % — SIGNIFICANT CHANGE UP (ref 0–2)
BILIRUB SERPL-MCNC: 0.7 MG/DL — SIGNIFICANT CHANGE UP (ref 0.2–1.2)
BILIRUB SERPL-MCNC: 0.7 MG/DL — SIGNIFICANT CHANGE UP (ref 0.2–1.2)
BILIRUB UR-MCNC: NEGATIVE — SIGNIFICANT CHANGE UP
BILIRUB UR-MCNC: NEGATIVE — SIGNIFICANT CHANGE UP
BUN SERPL-MCNC: 11 MG/DL — SIGNIFICANT CHANGE UP (ref 7–23)
BUN SERPL-MCNC: 11 MG/DL — SIGNIFICANT CHANGE UP (ref 7–23)
CALCIUM SERPL-MCNC: 9.2 MG/DL — SIGNIFICANT CHANGE UP (ref 8.4–10.5)
CALCIUM SERPL-MCNC: 9.2 MG/DL — SIGNIFICANT CHANGE UP (ref 8.4–10.5)
CHLORIDE SERPL-SCNC: 105 MMOL/L — SIGNIFICANT CHANGE UP (ref 96–108)
CHLORIDE SERPL-SCNC: 105 MMOL/L — SIGNIFICANT CHANGE UP (ref 96–108)
CO2 SERPL-SCNC: 29 MMOL/L — SIGNIFICANT CHANGE UP (ref 22–31)
CO2 SERPL-SCNC: 29 MMOL/L — SIGNIFICANT CHANGE UP (ref 22–31)
COLOR SPEC: YELLOW — SIGNIFICANT CHANGE UP
COLOR SPEC: YELLOW — SIGNIFICANT CHANGE UP
CREAT SERPL-MCNC: 0.72 MG/DL — SIGNIFICANT CHANGE UP (ref 0.5–1.3)
CREAT SERPL-MCNC: 0.72 MG/DL — SIGNIFICANT CHANGE UP (ref 0.5–1.3)
DIFF PNL FLD: NEGATIVE — SIGNIFICANT CHANGE UP
DIFF PNL FLD: NEGATIVE — SIGNIFICANT CHANGE UP
EGFR: 84 ML/MIN/1.73M2 — SIGNIFICANT CHANGE UP
EGFR: 84 ML/MIN/1.73M2 — SIGNIFICANT CHANGE UP
EOSINOPHIL # BLD AUTO: 0.08 K/UL — SIGNIFICANT CHANGE UP (ref 0–0.5)
EOSINOPHIL # BLD AUTO: 0.08 K/UL — SIGNIFICANT CHANGE UP (ref 0–0.5)
EOSINOPHIL NFR BLD AUTO: 1.3 % — SIGNIFICANT CHANGE UP (ref 0–6)
EOSINOPHIL NFR BLD AUTO: 1.3 % — SIGNIFICANT CHANGE UP (ref 0–6)
EPI CELLS # UR: 4 — SIGNIFICANT CHANGE UP
EPI CELLS # UR: 4 — SIGNIFICANT CHANGE UP
GLUCOSE SERPL-MCNC: 91 MG/DL — SIGNIFICANT CHANGE UP (ref 70–99)
GLUCOSE SERPL-MCNC: 91 MG/DL — SIGNIFICANT CHANGE UP (ref 70–99)
GLUCOSE UR QL: NEGATIVE MG/DL — SIGNIFICANT CHANGE UP
GLUCOSE UR QL: NEGATIVE MG/DL — SIGNIFICANT CHANGE UP
HCT VFR BLD CALC: 44.1 % — SIGNIFICANT CHANGE UP (ref 34.5–45)
HCT VFR BLD CALC: 44.1 % — SIGNIFICANT CHANGE UP (ref 34.5–45)
HGB BLD-MCNC: 14.3 G/DL — SIGNIFICANT CHANGE UP (ref 11.5–15.5)
HGB BLD-MCNC: 14.3 G/DL — SIGNIFICANT CHANGE UP (ref 11.5–15.5)
IMM GRANULOCYTES NFR BLD AUTO: 0.3 % — SIGNIFICANT CHANGE UP (ref 0–0.9)
IMM GRANULOCYTES NFR BLD AUTO: 0.3 % — SIGNIFICANT CHANGE UP (ref 0–0.9)
INR BLD: 0.99 RATIO — SIGNIFICANT CHANGE UP (ref 0.85–1.18)
INR BLD: 0.99 RATIO — SIGNIFICANT CHANGE UP (ref 0.85–1.18)
KETONES UR-MCNC: NEGATIVE MG/DL — SIGNIFICANT CHANGE UP
KETONES UR-MCNC: NEGATIVE MG/DL — SIGNIFICANT CHANGE UP
LEUKOCYTE ESTERASE UR-ACNC: ABNORMAL
LEUKOCYTE ESTERASE UR-ACNC: ABNORMAL
LIDOCAIN IGE QN: 43 U/L — SIGNIFICANT CHANGE UP (ref 16–77)
LIDOCAIN IGE QN: 43 U/L — SIGNIFICANT CHANGE UP (ref 16–77)
LYMPHOCYTES # BLD AUTO: 1.79 K/UL — SIGNIFICANT CHANGE UP (ref 1–3.3)
LYMPHOCYTES # BLD AUTO: 1.79 K/UL — SIGNIFICANT CHANGE UP (ref 1–3.3)
LYMPHOCYTES # BLD AUTO: 29.6 % — SIGNIFICANT CHANGE UP (ref 13–44)
LYMPHOCYTES # BLD AUTO: 29.6 % — SIGNIFICANT CHANGE UP (ref 13–44)
MCHC RBC-ENTMCNC: 30.4 PG — SIGNIFICANT CHANGE UP (ref 27–34)
MCHC RBC-ENTMCNC: 30.4 PG — SIGNIFICANT CHANGE UP (ref 27–34)
MCHC RBC-ENTMCNC: 32.4 GM/DL — SIGNIFICANT CHANGE UP (ref 32–36)
MCHC RBC-ENTMCNC: 32.4 GM/DL — SIGNIFICANT CHANGE UP (ref 32–36)
MCV RBC AUTO: 93.8 FL — SIGNIFICANT CHANGE UP (ref 80–100)
MCV RBC AUTO: 93.8 FL — SIGNIFICANT CHANGE UP (ref 80–100)
MONOCYTES # BLD AUTO: 0.4 K/UL — SIGNIFICANT CHANGE UP (ref 0–0.9)
MONOCYTES # BLD AUTO: 0.4 K/UL — SIGNIFICANT CHANGE UP (ref 0–0.9)
MONOCYTES NFR BLD AUTO: 6.6 % — SIGNIFICANT CHANGE UP (ref 2–14)
MONOCYTES NFR BLD AUTO: 6.6 % — SIGNIFICANT CHANGE UP (ref 2–14)
NEUTROPHILS # BLD AUTO: 3.73 K/UL — SIGNIFICANT CHANGE UP (ref 1.8–7.4)
NEUTROPHILS # BLD AUTO: 3.73 K/UL — SIGNIFICANT CHANGE UP (ref 1.8–7.4)
NEUTROPHILS NFR BLD AUTO: 61.7 % — SIGNIFICANT CHANGE UP (ref 43–77)
NEUTROPHILS NFR BLD AUTO: 61.7 % — SIGNIFICANT CHANGE UP (ref 43–77)
NITRITE UR-MCNC: NEGATIVE — SIGNIFICANT CHANGE UP
NITRITE UR-MCNC: NEGATIVE — SIGNIFICANT CHANGE UP
NRBC # BLD: 0 /100 WBCS — SIGNIFICANT CHANGE UP (ref 0–0)
NRBC # BLD: 0 /100 WBCS — SIGNIFICANT CHANGE UP (ref 0–0)
PH UR: 7 — SIGNIFICANT CHANGE UP (ref 5–8)
PH UR: 7 — SIGNIFICANT CHANGE UP (ref 5–8)
PLATELET # BLD AUTO: 225 K/UL — SIGNIFICANT CHANGE UP (ref 150–400)
PLATELET # BLD AUTO: 225 K/UL — SIGNIFICANT CHANGE UP (ref 150–400)
POTASSIUM SERPL-MCNC: 3.5 MMOL/L — SIGNIFICANT CHANGE UP (ref 3.5–5.3)
POTASSIUM SERPL-MCNC: 3.5 MMOL/L — SIGNIFICANT CHANGE UP (ref 3.5–5.3)
POTASSIUM SERPL-SCNC: 3.5 MMOL/L — SIGNIFICANT CHANGE UP (ref 3.5–5.3)
POTASSIUM SERPL-SCNC: 3.5 MMOL/L — SIGNIFICANT CHANGE UP (ref 3.5–5.3)
PROT SERPL-MCNC: 7 G/DL — SIGNIFICANT CHANGE UP (ref 6–8.3)
PROT SERPL-MCNC: 7 G/DL — SIGNIFICANT CHANGE UP (ref 6–8.3)
PROT UR-MCNC: NEGATIVE MG/DL — SIGNIFICANT CHANGE UP
PROT UR-MCNC: NEGATIVE MG/DL — SIGNIFICANT CHANGE UP
PROTHROM AB SERPL-ACNC: 11.3 SEC — SIGNIFICANT CHANGE UP (ref 9.5–13)
PROTHROM AB SERPL-ACNC: 11.3 SEC — SIGNIFICANT CHANGE UP (ref 9.5–13)
RBC # BLD: 4.7 M/UL — SIGNIFICANT CHANGE UP (ref 3.8–5.2)
RBC # BLD: 4.7 M/UL — SIGNIFICANT CHANGE UP (ref 3.8–5.2)
RBC # FLD: 12.4 % — SIGNIFICANT CHANGE UP (ref 10.3–14.5)
RBC # FLD: 12.4 % — SIGNIFICANT CHANGE UP (ref 10.3–14.5)
RBC CASTS # UR COMP ASSIST: 2 /HPF — SIGNIFICANT CHANGE UP (ref 0–4)
RBC CASTS # UR COMP ASSIST: 2 /HPF — SIGNIFICANT CHANGE UP (ref 0–4)
SODIUM SERPL-SCNC: 142 MMOL/L — SIGNIFICANT CHANGE UP (ref 135–145)
SODIUM SERPL-SCNC: 142 MMOL/L — SIGNIFICANT CHANGE UP (ref 135–145)
SP GR SPEC: 1 — LOW (ref 1–1.03)
SP GR SPEC: 1 — LOW (ref 1–1.03)
TROPONIN I, HIGH SENSITIVITY RESULT: 10.9 NG/L — SIGNIFICANT CHANGE UP
TROPONIN I, HIGH SENSITIVITY RESULT: 10.9 NG/L — SIGNIFICANT CHANGE UP
TROPONIN I, HIGH SENSITIVITY RESULT: 12.4 NG/L — SIGNIFICANT CHANGE UP
TROPONIN I, HIGH SENSITIVITY RESULT: 12.4 NG/L — SIGNIFICANT CHANGE UP
TROPONIN I, HIGH SENSITIVITY RESULT: 5.8 NG/L — SIGNIFICANT CHANGE UP
TROPONIN I, HIGH SENSITIVITY RESULT: 5.8 NG/L — SIGNIFICANT CHANGE UP
UROBILINOGEN FLD QL: 0.2 MG/DL — SIGNIFICANT CHANGE UP (ref 0.2–1)
UROBILINOGEN FLD QL: 0.2 MG/DL — SIGNIFICANT CHANGE UP (ref 0.2–1)
WBC # BLD: 6.05 K/UL — SIGNIFICANT CHANGE UP (ref 3.8–10.5)
WBC # BLD: 6.05 K/UL — SIGNIFICANT CHANGE UP (ref 3.8–10.5)
WBC # FLD AUTO: 6.05 K/UL — SIGNIFICANT CHANGE UP (ref 3.8–10.5)
WBC # FLD AUTO: 6.05 K/UL — SIGNIFICANT CHANGE UP (ref 3.8–10.5)
WBC UR QL: 8 /HPF — HIGH (ref 0–5)
WBC UR QL: 8 /HPF — HIGH (ref 0–5)

## 2023-11-12 PROCEDURE — 71045 X-RAY EXAM CHEST 1 VIEW: CPT | Mod: 26

## 2023-11-12 PROCEDURE — 99223 1ST HOSP IP/OBS HIGH 75: CPT

## 2023-11-12 PROCEDURE — 99285 EMERGENCY DEPT VISIT HI MDM: CPT

## 2023-11-12 PROCEDURE — 74177 CT ABD & PELVIS W/CONTRAST: CPT | Mod: 26,MA

## 2023-11-12 PROCEDURE — 93010 ELECTROCARDIOGRAM REPORT: CPT

## 2023-11-12 RX ORDER — AMLODIPINE BESYLATE 2.5 MG/1
5 TABLET ORAL DAILY
Refills: 0 | Status: DISCONTINUED | OUTPATIENT
Start: 2023-11-12 | End: 2023-11-14

## 2023-11-12 RX ORDER — ONDANSETRON 8 MG/1
4 TABLET, FILM COATED ORAL EVERY 8 HOURS
Refills: 0 | Status: DISCONTINUED | OUTPATIENT
Start: 2023-11-12 | End: 2023-11-14

## 2023-11-12 RX ORDER — SUCRALFATE 1 G
1 TABLET ORAL
Refills: 0 | Status: DISCONTINUED | OUTPATIENT
Start: 2023-11-12 | End: 2023-11-12

## 2023-11-12 RX ORDER — ATORVASTATIN CALCIUM 80 MG/1
20 TABLET, FILM COATED ORAL AT BEDTIME
Refills: 0 | Status: DISCONTINUED | OUTPATIENT
Start: 2023-11-12 | End: 2023-11-14

## 2023-11-12 RX ORDER — ACETAMINOPHEN 500 MG
1000 TABLET ORAL ONCE
Refills: 0 | Status: COMPLETED | OUTPATIENT
Start: 2023-11-12 | End: 2023-11-12

## 2023-11-12 RX ORDER — PANTOPRAZOLE SODIUM 20 MG/1
40 TABLET, DELAYED RELEASE ORAL
Refills: 0 | Status: DISCONTINUED | OUTPATIENT
Start: 2023-11-12 | End: 2023-11-13

## 2023-11-12 RX ORDER — ACETAMINOPHEN 500 MG
650 TABLET ORAL EVERY 6 HOURS
Refills: 0 | Status: DISCONTINUED | OUTPATIENT
Start: 2023-11-12 | End: 2023-11-14

## 2023-11-12 RX ORDER — ALPRAZOLAM 0.25 MG
0.25 TABLET ORAL ONCE
Refills: 0 | Status: DISCONTINUED | OUTPATIENT
Start: 2023-11-12 | End: 2023-11-12

## 2023-11-12 RX ORDER — SUCRALFATE 1 G
1 TABLET ORAL
Refills: 0 | Status: DISCONTINUED | OUTPATIENT
Start: 2023-11-12 | End: 2023-11-14

## 2023-11-12 RX ORDER — SODIUM CHLORIDE 9 MG/ML
1000 INJECTION INTRAMUSCULAR; INTRAVENOUS; SUBCUTANEOUS ONCE
Refills: 0 | Status: COMPLETED | OUTPATIENT
Start: 2023-11-12 | End: 2023-11-12

## 2023-11-12 RX ORDER — METOCLOPRAMIDE HCL 10 MG
10 TABLET ORAL
Refills: 0 | Status: DISCONTINUED | OUTPATIENT
Start: 2023-11-12 | End: 2023-11-13

## 2023-11-12 RX ORDER — PANTOPRAZOLE SODIUM 20 MG/1
40 TABLET, DELAYED RELEASE ORAL ONCE
Refills: 0 | Status: COMPLETED | OUTPATIENT
Start: 2023-11-12 | End: 2023-11-12

## 2023-11-12 RX ORDER — ALPRAZOLAM 0.25 MG
0.25 TABLET ORAL DAILY
Refills: 0 | Status: DISCONTINUED | OUTPATIENT
Start: 2023-11-12 | End: 2023-11-13

## 2023-11-12 RX ORDER — METOPROLOL TARTRATE 50 MG
25 TABLET ORAL DAILY
Refills: 0 | Status: DISCONTINUED | OUTPATIENT
Start: 2023-11-12 | End: 2023-11-13

## 2023-11-12 RX ORDER — LANOLIN ALCOHOL/MO/W.PET/CERES
3 CREAM (GRAM) TOPICAL AT BEDTIME
Refills: 0 | Status: DISCONTINUED | OUTPATIENT
Start: 2023-11-12 | End: 2023-11-13

## 2023-11-12 RX ADMIN — Medication 10 MILLIGRAM(S): at 18:00

## 2023-11-12 RX ADMIN — Medication 400 MILLIGRAM(S): at 10:12

## 2023-11-12 RX ADMIN — Medication 0.25 MILLIGRAM(S): at 13:14

## 2023-11-12 RX ADMIN — Medication 1000 MILLIGRAM(S): at 11:00

## 2023-11-12 RX ADMIN — PANTOPRAZOLE SODIUM 40 MILLIGRAM(S): 20 TABLET, DELAYED RELEASE ORAL at 18:03

## 2023-11-12 RX ADMIN — SODIUM CHLORIDE 1000 MILLILITER(S): 9 INJECTION INTRAMUSCULAR; INTRAVENOUS; SUBCUTANEOUS at 11:10

## 2023-11-12 RX ADMIN — AMLODIPINE BESYLATE 5 MILLIGRAM(S): 2.5 TABLET ORAL at 21:54

## 2023-11-12 RX ADMIN — Medication 1 GRAM(S): at 21:54

## 2023-11-12 RX ADMIN — Medication 1000 MILLIGRAM(S): at 10:30

## 2023-11-12 RX ADMIN — PANTOPRAZOLE SODIUM 40 MILLIGRAM(S): 20 TABLET, DELAYED RELEASE ORAL at 10:11

## 2023-11-12 RX ADMIN — SODIUM CHLORIDE 2000 MILLILITER(S): 9 INJECTION INTRAMUSCULAR; INTRAVENOUS; SUBCUTANEOUS at 10:10

## 2023-11-12 NOTE — ED ADULT NURSE NOTE - NSFALLUNIVINTERV_ED_ALL_ED
Bed/Stretcher in lowest position, wheels locked, appropriate side rails in place/Call bell, personal items and telephone in reach/Instruct patient to call for assistance before getting out of bed/chair/stretcher/Non-slip footwear applied when patient is off stretcher/[place] to call system/Physically safe environment - no spills, clutter or unnecessary equipment/Purposeful proactive rounding/Room/bathroom lighting operational, light cord in reach

## 2023-11-12 NOTE — H&P ADULT - NSICDXFAMILYHX_GEN_ALL_CORE_FT
FAMILY HISTORY:  Family history of hypertension in mother, also colon cancer  Family history of lung cancer, father -  age 61- lung ca    Sibling  Still living? Unknown  FH: colon cancer, Age at diagnosis: Age Unknown

## 2023-11-12 NOTE — H&P ADULT - HISTORY OF PRESENT ILLNESS
Patient is an 82 year old F wih a PMHx of GERD, gastric ulcers, HTN, breast cancer s/p radiation tx in  presenting to  ED for epigastric pain x3 weeks and frandy colored stools. Patient reports she has had this pain several times in the past but is now refractory to Protonix. Patient states she has lost nearly 10 lbs in the last month 2/2 to epigastric pain and ensuing decreased PO tolerance. She also notes frandy colored stools that "float" over the last several days. Symptoms associated with abdominal distention and significant belching. She denies any diarrhea/constipation, melena, hematemesis/hematochezia.     Of note patient states her mother  from breast cancer and several of her siblings  from breast and colon cancer. She reports her last colonoscopy was 3 years ago and was normal at which point she was told she owuld likely not require any more colonoscopies.     In the ED patient had a troponin of 5 that increased to 10 however she has no chest pain, no fevers/chills, LOC, palpitations, SOB, LE Edema.  Patient is an 82 year old F wih a PMHx of GERD, gastric ulcers/gastritis, HTN, breast cancer s/p radiation tx in  presenting to  ED for epigastric pain x3 weeks and frandy colored stools. Patient reports she has had this pain several times in the past but is now refractory to Protonix. Patient states she has lost nearly 10 lbs in the last month 2/2 to epigastric pain and ensuing decreased PO tolerance. She also notes frandy colored stools that "float" over the last several days. Symptoms associated with abdominal distention and significant belching. She denies any diarrhea/constipation, melena, hematemesis/hematochezia.     Of note patient states her mother  from breast cancer and several of her siblings  from breast and colon cancer. She reports her last colonoscopy was 3 years ago and was normal at which point she was told she owuld likely not require any more colonoscopies.     In the ED patient had a troponin of 5 that increased to 10 however she has no chest pain, no fevers/chills, LOC, palpitations, SOB, LE Edema.

## 2023-11-12 NOTE — ED PROVIDER NOTE - OBJECTIVE STATEMENT
82 year old F wih a PMHx of GERD, gastric ulcers/gastritis, HTN, breast cancer s/p radiation tx in 1985 presenting to  ED for epigastric pain into lower chest  x3 weeks and frandy colored stools that float. Patient reports she has had this pain several times in the past but is now refractory to Protonix. Pain is now constant, not related to food. Patient states she has lost nearly 10 lbs in the last month 2/2 to epigastric pain and ensuing decreased PO tolerance. She also notes frandy colored stools that "float" over the last several days. Symptoms associated with abdominal distention and significant belching. No fever, chills, or sob. Also states she has esophageal spasms which resolve with Xanax.

## 2023-11-12 NOTE — ED PROVIDER NOTE - PHYSICAL EXAMINATION
Gen:  alert, awake, no acute distress  Head:  atraumatic, normocephalic  HEENT: PERRLA, EOMI, normal nose, normal oropharynx, no tonsillar edema, erythema, or exudate  CV:  rrr, nl S1, S2, no m/r/g, no chest wall ttp  Pulm:  lungs CTA b/l  Abd: s/nd, +BS, +epigastric ttp  MSK:  moving all extremities, no back midline ttp, no stepoffs, no cva TTP  Neuro:  grossly intact, no focal deficits  Skin:  clear, dry, intact  Psych: AOx3, normal affect, no apparent risk to self or others Include Pregnancy/Lactation Warning?: No

## 2023-11-12 NOTE — H&P ADULT - NSHPPHYSICALEXAM_GEN_ALL_CORE
Constitutional: Pt lying in bed, awake and alert, NAD  HEENT: EOMI, normal hearing, moist mucous membranes  Neck: Soft and supple, no JVD  Respiratory: CTABL, No wheezing, rales or rhonchi  Cardiovascular: S1S2+, RRR, no M/G/R  Gastrointestinal: BS+, soft, no guarding, no rebound +mildly distended +mild epigastric ttp   Extremities: No peripheral edema  Vascular: 2+ peripheral pulses  Neurological: AAOx3, no focal deficits  Musculoskeletal: 5/5 strength b/l upper and lower extremities  Skin: No rashes

## 2023-11-12 NOTE — ED ADULT NURSE NOTE - OBJECTIVE STATEMENT
Pt presents to ED from home for 3 weeks of epigastric pain. Pt states she has continuous belching and reflux not relieved with protonix. She states that her stool floats and is a "mustard" color. Pt reports a history of gastric ulcer and esophagitis/gastritis. Denies any blood in stool. Reports nausea without vomiting.

## 2023-11-12 NOTE — H&P ADULT - ATTENDING COMMENTS
83 year old female with past medical history of HTN, HLD, PUD/gastritis, esophageal spasm, who presented with ~1 month history of epigastric pain associated with nausea/increased belching and increased gas w/ orange colored stool. Epigastric pain is constant, non-radiating and is exacerbated with oral intake. She has been taking carafate BID and pantoprazole QD without significant improvement. She also takes xanax QD for esophageal spasm. She has had ~10 pound weight loss over the last month.  Her last EGD was in 1/2023 with Dr. Eid which reportedly showed some redness in her stomach. Her last colonoscopy was ~2 years ago and was told she did not need further surveillance. She last saw GI about 6 months ago and is due to follow up soon.  She follows Dr. Reyes (cardiology) and had Echo/Stress done 3 months ago which were reportedly unremarkable. She denies chest pain/palpitation/SOB.   In the ED, she was afebrile, /82 on presentation.   Labs were unremarkable including LFT's/lipase. Trops negative x 2. EKG showed NSR, no acute ST/T wave changes.   UA showed moderate LE and 8 WBC - she denies any urinary symptoms.   CT abd/pelvis done showed No acute pathology in the abdomen or pelvis.  CXR showed no acute findings.     Patient with ~1 month history of epigastric pain exacerbated with oral intake associated with unintentional weight loss. She has history of PUD/gastritis/esophageal spasm. Labs/imaging are otherwise unremarkable. Agree with GI consult for possible EGD this admission. Continue carafate increase to QID and start PPI BID in the meantime.   Her trops are negative x 2 and EKG showed no acute changes. She has no cardiac complaints. Would obtain records of her most recent echo and stress from Dr. Reyes office in AM.   Resume home meds for HTN. 83 year old female with past medical history of HTN, HLD, PUD/gastritis, esophageal spasm, who presented with ~1 month history of epigastric pain associated with nausea/increased belching and increased gas w/ orange colored stool. Epigastric pain is constant, non-radiating and is exacerbated with oral intake. She has been taking carafate BID and pantoprazole QD without significant improvement. She also takes xanax QD for esophageal spasm. She has had ~10 pound weight loss over the last month.  Her last EGD was in 1/2023 with Dr. Eid which reportedly showed some redness in her stomach. Her last colonoscopy was ~2 years ago and was told she did not need further surveillance. She last saw GI about 6 months ago and is due to follow up soon.  She follows Dr. Reyes (cardiology) and had Echo/Stress done 3 months ago which were reportedly unremarkable. She denies chest pain/palpitation/SOB.   In the ED, she was afebrile, /82 on presentation.   Labs were unremarkable including LFT's/lipase. Initial trop 5.8 -> 10.9. EKG showed NSR, no acute ST/T wave changes.   UA showed moderate LE and 8 WBC - she denies any urinary symptoms.   CT abd/pelvis done showed No acute pathology in the abdomen or pelvis.  CXR showed no acute findings.     Patient with ~1 month history of epigastric pain exacerbated with oral intake associated with unintentional weight loss. She has history of PUD/gastritis/esophageal spasm. Labs/imaging are otherwise unremarkable. Agree with GI consult for possible EGD this admission. Continue carafate increase to QID and start PPI BID in the meantime.   Trop 5.8 -> 10.9 and EKG showed no acute changes. ACS is ruled out. She has no cardiac complaints. Would obtain records of her most recent echo and stress from Dr. Reyes office in AM.   Resume home meds for HTN.

## 2023-11-12 NOTE — PATIENT PROFILE ADULT - FALL HARM RISK - UNIVERSAL INTERVENTIONS
Bed in lowest position, wheels locked, appropriate side rails in place/Call bell, personal items and telephone in reach/Instruct patient to call for assistance before getting out of bed or chair/Non-slip footwear when patient is out of bed/[place] to call system/Physically safe environment - no spills, clutter or unnecessary equipment/Purposeful Proactive Rounding/Room/bathroom lighting operational, light cord in reach Oculoplastic Surgeon Procedure Text (A): After obtaining clear surgical margins the patient was sent to oculoplastics for surgical repair.  The patient understands they will receive post-surgical care and follow-up from the referring physician's office.

## 2023-11-12 NOTE — ED PROVIDER NOTE - CLINICAL SUMMARY MEDICAL DECISION MAKING FREE TEXT BOX
82 year old F wih a PMHx of GERD, gastric ulcers/gastritis, HTN, breast cancer s/p radiation tx in 1985 presenting to  ED for epigastric pain into lower chest  x3 weeks and frandy colored stools that float. Patient reports she has had this pain several times in the past but is now refractory to Protonix. Pain is now constant, not related to food. Patient states she has lost nearly 10 lbs in the last month 2/2 to epigastric pain and ensuing decreased PO tolerance. She also notes frandy colored stools that "float" over the last several days. Symptoms associated with abdominal distention and significant belching. No fever, chills, or sob. Also states she has esophageal spasms which resolve with Xanax.    cbc, cmp, lipase, ekg, trop, iv protonix, ct a/p  CT negative, patient states no improvement in pain with IV Tylenol and IV Protonix  We will repeat troponin to make sure not trending up  Troponin trended up will admit for monitoring, Dr. Zamora aware

## 2023-11-12 NOTE — H&P ADULT - ASSESSMENT
Patient is an 82 year old F wih a PMHx of GERD, gastric ulcers, HTN, breast cancer s/p radiation tx in 1985 presenting to  ED for epigastric pain x3 weeks and frandy colored stools being admitted for:  - - -

## 2023-11-12 NOTE — ED ADULT TRIAGE NOTE - CHIEF COMPLAINT QUOTE
Patient presents to ED from home complaining of epigastric pain and reflux x3-4 weeks. Patient complains of gas & persistent belching. Notes bright yellow floating stools. Pain is much worse after eating. Patient started pantoprazole 3 weeks ago, xanax 2 weeks ago for esophageal spasms, and carafate 3-4 days ago. Patient states she is not getting any relief from medication.

## 2023-11-12 NOTE — H&P ADULT - PROBLEM SELECTOR PLAN 1
- s/p IV Protonix in ED  - c/w IV Protonix BID  - GI referral requested for potential EGD   - carafate 1g BID  - unclear etiology of frandy colored stools; ALK/Bilirubin/Lipase all WNL. Continue to monitor  - avoid NSAIDs

## 2023-11-12 NOTE — H&P ADULT - NSHPREVIEWOFSYSTEMS_GEN_ALL_CORE
CONSTITUTIONAL: No weakness, fevers or chills  EYES/ENT: No visual changes;  No vertigo or throat pain   NECK: No pain or stiffness  RESPIRATORY: No cough, wheezing, hemoptysis; No shortness of breath  CARDIOVASCULAR: No chest pain or palpitations  GASTROINTESTINAL: +epigastric pain +frandy colored stools. No nausea, vomiting, or hematemesis; No diarrhea or constipation. No melena or hematochezia.  GENITOURINARY: No dysuria, frequency or hematuria  NEUROLOGICAL: No numbness or weakness  SKIN: No itching, rashes

## 2023-11-12 NOTE — ED PROVIDER NOTE - NSICDXPASTSURGICALHX_GEN_ALL_CORE_FT
PAST SURGICAL HISTORY:  H/O repair of left rotator cuff 2012    History of tonsillectomy 8 y/o    S/P hysterectomy in 1980    S/P lumpectomy of breast left in 1985

## 2023-11-12 NOTE — H&P ADULT - PROBLEM SELECTOR PLAN 2
- troponin in ED from 5>10  - continue to trend  - Telemetry monitoring; continous  - TTE requested  - Cardiology requested  - O2 to maintain O2 Sat > 92%  - states last stress test and Echo performed in September WNL with the exception of occasional PVCs  - EKG personally reviewed with no acute ST changes or TWI - Ruled out for ACS with serial troponins  - Monitor on telemetry  - states last stress test and Echo performed in September WNL with the exception of occasional PVCs - would obtain record in AM  - EKG personally reviewed with no acute ST changes or TWI

## 2023-11-13 DIAGNOSIS — R10.13 EPIGASTRIC PAIN: ICD-10-CM

## 2023-11-13 LAB
A1C WITH ESTIMATED AVERAGE GLUCOSE RESULT: 5.7 % — HIGH (ref 4–5.6)
A1C WITH ESTIMATED AVERAGE GLUCOSE RESULT: 5.7 % — HIGH (ref 4–5.6)
ALBUMIN SERPL ELPH-MCNC: 3.1 G/DL — LOW (ref 3.3–5)
ALBUMIN SERPL ELPH-MCNC: 3.1 G/DL — LOW (ref 3.3–5)
ALP SERPL-CCNC: 60 U/L — SIGNIFICANT CHANGE UP (ref 40–120)
ALP SERPL-CCNC: 60 U/L — SIGNIFICANT CHANGE UP (ref 40–120)
ALT FLD-CCNC: 20 U/L — SIGNIFICANT CHANGE UP (ref 10–45)
ALT FLD-CCNC: 20 U/L — SIGNIFICANT CHANGE UP (ref 10–45)
ANION GAP SERPL CALC-SCNC: 8 MMOL/L — SIGNIFICANT CHANGE UP (ref 5–17)
ANION GAP SERPL CALC-SCNC: 8 MMOL/L — SIGNIFICANT CHANGE UP (ref 5–17)
AST SERPL-CCNC: 14 U/L — SIGNIFICANT CHANGE UP (ref 10–40)
AST SERPL-CCNC: 14 U/L — SIGNIFICANT CHANGE UP (ref 10–40)
BASOPHILS # BLD AUTO: 0.04 K/UL — SIGNIFICANT CHANGE UP (ref 0–0.2)
BASOPHILS # BLD AUTO: 0.04 K/UL — SIGNIFICANT CHANGE UP (ref 0–0.2)
BASOPHILS NFR BLD AUTO: 0.6 % — SIGNIFICANT CHANGE UP (ref 0–2)
BASOPHILS NFR BLD AUTO: 0.6 % — SIGNIFICANT CHANGE UP (ref 0–2)
BILIRUB SERPL-MCNC: 0.6 MG/DL — SIGNIFICANT CHANGE UP (ref 0.2–1.2)
BILIRUB SERPL-MCNC: 0.6 MG/DL — SIGNIFICANT CHANGE UP (ref 0.2–1.2)
BUN SERPL-MCNC: 8 MG/DL — SIGNIFICANT CHANGE UP (ref 7–23)
BUN SERPL-MCNC: 8 MG/DL — SIGNIFICANT CHANGE UP (ref 7–23)
CALCIUM SERPL-MCNC: 9.1 MG/DL — SIGNIFICANT CHANGE UP (ref 8.4–10.5)
CALCIUM SERPL-MCNC: 9.1 MG/DL — SIGNIFICANT CHANGE UP (ref 8.4–10.5)
CHLORIDE SERPL-SCNC: 105 MMOL/L — SIGNIFICANT CHANGE UP (ref 96–108)
CHLORIDE SERPL-SCNC: 105 MMOL/L — SIGNIFICANT CHANGE UP (ref 96–108)
CHOLEST SERPL-MCNC: 177 MG/DL — SIGNIFICANT CHANGE UP
CHOLEST SERPL-MCNC: 177 MG/DL — SIGNIFICANT CHANGE UP
CO2 SERPL-SCNC: 29 MMOL/L — SIGNIFICANT CHANGE UP (ref 22–31)
CO2 SERPL-SCNC: 29 MMOL/L — SIGNIFICANT CHANGE UP (ref 22–31)
CREAT SERPL-MCNC: 0.78 MG/DL — SIGNIFICANT CHANGE UP (ref 0.5–1.3)
CREAT SERPL-MCNC: 0.78 MG/DL — SIGNIFICANT CHANGE UP (ref 0.5–1.3)
CULTURE RESULTS: SIGNIFICANT CHANGE UP
CULTURE RESULTS: SIGNIFICANT CHANGE UP
EGFR: 76 ML/MIN/1.73M2 — SIGNIFICANT CHANGE UP
EGFR: 76 ML/MIN/1.73M2 — SIGNIFICANT CHANGE UP
EOSINOPHIL # BLD AUTO: 0.21 K/UL — SIGNIFICANT CHANGE UP (ref 0–0.5)
EOSINOPHIL # BLD AUTO: 0.21 K/UL — SIGNIFICANT CHANGE UP (ref 0–0.5)
EOSINOPHIL NFR BLD AUTO: 3 % — SIGNIFICANT CHANGE UP (ref 0–6)
EOSINOPHIL NFR BLD AUTO: 3 % — SIGNIFICANT CHANGE UP (ref 0–6)
ESTIMATED AVERAGE GLUCOSE: 117 MG/DL — HIGH (ref 68–114)
ESTIMATED AVERAGE GLUCOSE: 117 MG/DL — HIGH (ref 68–114)
GLUCOSE SERPL-MCNC: 108 MG/DL — HIGH (ref 70–99)
GLUCOSE SERPL-MCNC: 108 MG/DL — HIGH (ref 70–99)
HCT VFR BLD CALC: 41.6 % — SIGNIFICANT CHANGE UP (ref 34.5–45)
HCT VFR BLD CALC: 41.6 % — SIGNIFICANT CHANGE UP (ref 34.5–45)
HDLC SERPL-MCNC: 49 MG/DL — LOW
HDLC SERPL-MCNC: 49 MG/DL — LOW
HGB BLD-MCNC: 13.4 G/DL — SIGNIFICANT CHANGE UP (ref 11.5–15.5)
HGB BLD-MCNC: 13.4 G/DL — SIGNIFICANT CHANGE UP (ref 11.5–15.5)
IMM GRANULOCYTES NFR BLD AUTO: 0.4 % — SIGNIFICANT CHANGE UP (ref 0–0.9)
IMM GRANULOCYTES NFR BLD AUTO: 0.4 % — SIGNIFICANT CHANGE UP (ref 0–0.9)
LIPID PNL WITH DIRECT LDL SERPL: 111 MG/DL — HIGH
LIPID PNL WITH DIRECT LDL SERPL: 111 MG/DL — HIGH
LYMPHOCYTES # BLD AUTO: 2.09 K/UL — SIGNIFICANT CHANGE UP (ref 1–3.3)
LYMPHOCYTES # BLD AUTO: 2.09 K/UL — SIGNIFICANT CHANGE UP (ref 1–3.3)
LYMPHOCYTES # BLD AUTO: 30.1 % — SIGNIFICANT CHANGE UP (ref 13–44)
LYMPHOCYTES # BLD AUTO: 30.1 % — SIGNIFICANT CHANGE UP (ref 13–44)
MCHC RBC-ENTMCNC: 30.4 PG — SIGNIFICANT CHANGE UP (ref 27–34)
MCHC RBC-ENTMCNC: 30.4 PG — SIGNIFICANT CHANGE UP (ref 27–34)
MCHC RBC-ENTMCNC: 32.2 GM/DL — SIGNIFICANT CHANGE UP (ref 32–36)
MCHC RBC-ENTMCNC: 32.2 GM/DL — SIGNIFICANT CHANGE UP (ref 32–36)
MCV RBC AUTO: 94.3 FL — SIGNIFICANT CHANGE UP (ref 80–100)
MCV RBC AUTO: 94.3 FL — SIGNIFICANT CHANGE UP (ref 80–100)
MONOCYTES # BLD AUTO: 0.51 K/UL — SIGNIFICANT CHANGE UP (ref 0–0.9)
MONOCYTES # BLD AUTO: 0.51 K/UL — SIGNIFICANT CHANGE UP (ref 0–0.9)
MONOCYTES NFR BLD AUTO: 7.3 % — SIGNIFICANT CHANGE UP (ref 2–14)
MONOCYTES NFR BLD AUTO: 7.3 % — SIGNIFICANT CHANGE UP (ref 2–14)
NEUTROPHILS # BLD AUTO: 4.06 K/UL — SIGNIFICANT CHANGE UP (ref 1.8–7.4)
NEUTROPHILS # BLD AUTO: 4.06 K/UL — SIGNIFICANT CHANGE UP (ref 1.8–7.4)
NEUTROPHILS NFR BLD AUTO: 58.6 % — SIGNIFICANT CHANGE UP (ref 43–77)
NEUTROPHILS NFR BLD AUTO: 58.6 % — SIGNIFICANT CHANGE UP (ref 43–77)
NON HDL CHOLESTEROL: 128 MG/DL — SIGNIFICANT CHANGE UP
NON HDL CHOLESTEROL: 128 MG/DL — SIGNIFICANT CHANGE UP
NRBC # BLD: 0 /100 WBCS — SIGNIFICANT CHANGE UP (ref 0–0)
NRBC # BLD: 0 /100 WBCS — SIGNIFICANT CHANGE UP (ref 0–0)
PLATELET # BLD AUTO: 216 K/UL — SIGNIFICANT CHANGE UP (ref 150–400)
PLATELET # BLD AUTO: 216 K/UL — SIGNIFICANT CHANGE UP (ref 150–400)
POTASSIUM SERPL-MCNC: 3.5 MMOL/L — SIGNIFICANT CHANGE UP (ref 3.5–5.3)
POTASSIUM SERPL-MCNC: 3.5 MMOL/L — SIGNIFICANT CHANGE UP (ref 3.5–5.3)
POTASSIUM SERPL-SCNC: 3.5 MMOL/L — SIGNIFICANT CHANGE UP (ref 3.5–5.3)
POTASSIUM SERPL-SCNC: 3.5 MMOL/L — SIGNIFICANT CHANGE UP (ref 3.5–5.3)
PROT SERPL-MCNC: 6.2 G/DL — SIGNIFICANT CHANGE UP (ref 6–8.3)
PROT SERPL-MCNC: 6.2 G/DL — SIGNIFICANT CHANGE UP (ref 6–8.3)
RBC # BLD: 4.41 M/UL — SIGNIFICANT CHANGE UP (ref 3.8–5.2)
RBC # BLD: 4.41 M/UL — SIGNIFICANT CHANGE UP (ref 3.8–5.2)
RBC # FLD: 12.5 % — SIGNIFICANT CHANGE UP (ref 10.3–14.5)
RBC # FLD: 12.5 % — SIGNIFICANT CHANGE UP (ref 10.3–14.5)
SODIUM SERPL-SCNC: 142 MMOL/L — SIGNIFICANT CHANGE UP (ref 135–145)
SODIUM SERPL-SCNC: 142 MMOL/L — SIGNIFICANT CHANGE UP (ref 135–145)
SPECIMEN SOURCE: SIGNIFICANT CHANGE UP
SPECIMEN SOURCE: SIGNIFICANT CHANGE UP
TRIGL SERPL-MCNC: 93 MG/DL — SIGNIFICANT CHANGE UP
TRIGL SERPL-MCNC: 93 MG/DL — SIGNIFICANT CHANGE UP
WBC # BLD: 6.94 K/UL — SIGNIFICANT CHANGE UP (ref 3.8–10.5)
WBC # BLD: 6.94 K/UL — SIGNIFICANT CHANGE UP (ref 3.8–10.5)
WBC # FLD AUTO: 6.94 K/UL — SIGNIFICANT CHANGE UP (ref 3.8–10.5)
WBC # FLD AUTO: 6.94 K/UL — SIGNIFICANT CHANGE UP (ref 3.8–10.5)

## 2023-11-13 PROCEDURE — 93306 TTE W/DOPPLER COMPLETE: CPT | Mod: 26

## 2023-11-13 PROCEDURE — 99223 1ST HOSP IP/OBS HIGH 75: CPT | Mod: FS

## 2023-11-13 PROCEDURE — 99233 SBSQ HOSP IP/OBS HIGH 50: CPT

## 2023-11-13 PROCEDURE — 99222 1ST HOSP IP/OBS MODERATE 55: CPT

## 2023-11-13 RX ORDER — PANTOPRAZOLE SODIUM 20 MG/1
40 TABLET, DELAYED RELEASE ORAL
Refills: 0 | Status: DISCONTINUED | OUTPATIENT
Start: 2023-11-13 | End: 2023-11-13

## 2023-11-13 RX ORDER — METOCLOPRAMIDE HCL 10 MG
5 TABLET ORAL EVERY 8 HOURS
Refills: 0 | Status: DISCONTINUED | OUTPATIENT
Start: 2023-11-13 | End: 2023-11-14

## 2023-11-13 RX ORDER — METOPROLOL TARTRATE 50 MG
1 TABLET ORAL
Refills: 0 | DISCHARGE

## 2023-11-13 RX ORDER — ESOMEPRAZOLE MAGNESIUM 40 MG/1
1 CAPSULE, DELAYED RELEASE ORAL
Qty: 0 | Refills: 0 | DISCHARGE

## 2023-11-13 RX ORDER — SODIUM CHLORIDE 9 MG/ML
1000 INJECTION INTRAMUSCULAR; INTRAVENOUS; SUBCUTANEOUS
Refills: 0 | Status: DISCONTINUED | OUTPATIENT
Start: 2023-11-13 | End: 2023-11-13

## 2023-11-13 RX ORDER — METOPROLOL TARTRATE 50 MG
50 TABLET ORAL DAILY
Refills: 0 | Status: DISCONTINUED | OUTPATIENT
Start: 2023-11-13 | End: 2023-11-14

## 2023-11-13 RX ORDER — ROSUVASTATIN CALCIUM 5 MG/1
1 TABLET ORAL
Qty: 0 | Refills: 0 | DISCHARGE

## 2023-11-13 RX ORDER — AMLODIPINE BESYLATE 2.5 MG/1
1 TABLET ORAL
Qty: 0 | Refills: 0 | DISCHARGE

## 2023-11-13 RX ORDER — METOPROLOL TARTRATE 50 MG
5 TABLET ORAL EVERY 8 HOURS
Refills: 0 | Status: DISCONTINUED | OUTPATIENT
Start: 2023-11-13 | End: 2023-11-13

## 2023-11-13 RX ORDER — METOPROLOL TARTRATE 50 MG
1 TABLET ORAL
Qty: 0 | Refills: 0 | DISCHARGE

## 2023-11-13 RX ORDER — FAMOTIDINE 10 MG/ML
40 INJECTION INTRAVENOUS AT BEDTIME
Refills: 0 | Status: DISCONTINUED | OUTPATIENT
Start: 2023-11-13 | End: 2023-11-14

## 2023-11-13 RX ORDER — PANTOPRAZOLE SODIUM 20 MG/1
40 TABLET, DELAYED RELEASE ORAL EVERY 12 HOURS
Refills: 0 | Status: DISCONTINUED | OUTPATIENT
Start: 2023-11-13 | End: 2023-11-14

## 2023-11-13 RX ORDER — ALPRAZOLAM 0.25 MG
0.5 TABLET ORAL EVERY 12 HOURS
Refills: 0 | Status: DISCONTINUED | OUTPATIENT
Start: 2023-11-13 | End: 2023-11-13

## 2023-11-13 RX ORDER — LANOLIN ALCOHOL/MO/W.PET/CERES
3 CREAM (GRAM) TOPICAL AT BEDTIME
Refills: 0 | Status: DISCONTINUED | OUTPATIENT
Start: 2023-11-13 | End: 2023-11-14

## 2023-11-13 RX ORDER — ALPRAZOLAM 0.25 MG
1 TABLET ORAL
Qty: 0 | Refills: 0 | DISCHARGE

## 2023-11-13 RX ORDER — ENOXAPARIN SODIUM 100 MG/ML
40 INJECTION SUBCUTANEOUS EVERY 24 HOURS
Refills: 0 | Status: DISCONTINUED | OUTPATIENT
Start: 2023-11-13 | End: 2023-11-14

## 2023-11-13 RX ADMIN — SODIUM CHLORIDE 50 MILLILITER(S): 9 INJECTION INTRAMUSCULAR; INTRAVENOUS; SUBCUTANEOUS at 09:03

## 2023-11-13 RX ADMIN — PANTOPRAZOLE SODIUM 40 MILLIGRAM(S): 20 TABLET, DELAYED RELEASE ORAL at 06:45

## 2023-11-13 RX ADMIN — ENOXAPARIN SODIUM 40 MILLIGRAM(S): 100 INJECTION SUBCUTANEOUS at 17:48

## 2023-11-13 RX ADMIN — Medication 3 MILLIGRAM(S): at 01:45

## 2023-11-13 RX ADMIN — Medication 25 MILLIGRAM(S): at 05:21

## 2023-11-13 RX ADMIN — Medication 3 MILLIGRAM(S): at 21:34

## 2023-11-13 RX ADMIN — Medication 1 GRAM(S): at 14:45

## 2023-11-13 RX ADMIN — AMLODIPINE BESYLATE 5 MILLIGRAM(S): 2.5 TABLET ORAL at 19:37

## 2023-11-13 RX ADMIN — FAMOTIDINE 40 MILLIGRAM(S): 10 INJECTION INTRAVENOUS at 21:18

## 2023-11-13 NOTE — CONSULT NOTE ADULT - ASSESSMENT
Assessment: 82 year old F wih a PMHx of GERD, gastric ulcers/gastritis, HTN, breast cancer s/p radiation tx in 1985 presenting to  ED for epigastric pain x3 weeks and frandy colored stools with n/v. Cardiology consulted to r/o ACS. Pt denies chest pain, sob, cardiac hx in herself and family. States pain remains in the epigastric region and has improved since receiving IV protonix. Pain exacerbated by eating. Pt follows Dr Lam cardiology and states she recently had an echo and stress test AUG/SEPT in which it was normal. States last year she had a holter monitor that only showed occasional PVCs which she says she gets when she has her epigastric pain.   In the ED patient had a troponin of 5 that increased to 10>12. Troponins x 3 neg and EKG with no ischemic changes.    Recommendations:  epigastric pain: In setting of negative trops and normal ekg, low concern for ACS. Can obtain TTE to evaluate EF and any valvular disease. Recommend GI consult to assist in further evaluating the pain considering her extensive GI history.    Steph THOMAS-BC Assessment: 82 year old F with a PMHx of GERD, gastric ulcers/gastritis, HTN, breast cancer s/p radiation tx in 1985 presenting to  ED for epigastric pain x3 weeks and frandy colored stools with n/v. Cardiology consulted to r/o ACS. Pt denies chest pain, sob, cardiac hx in herself and family. States pain remains in the epigastric region and has improved since receiving IV protonix. Pain exacerbated by eating. Pt follows Dr Lam cardiology and states she recently had an echo and stress test AUG/SEPT in which it was normal. States last year she had a holter monitor that only showed occasional PVCs which she says she gets when she has her epigastric pain.   In the ED patient had a troponin of 5 that increased to 10>12. Troponins x 3 neg and EKG with no ischemic changes.    Recommendations:  epigastric pain: In setting of negative trops and normal ekg, low concern for ACS. Can obtain TTE to evaluate EF and any valvular disease. Recommend GI consult to assist in further evaluating the pain considering her extensive GI history.    Steph THOMAS-BC

## 2023-11-13 NOTE — CONSULT NOTE ADULT - PROBLEM SELECTOR RECOMMENDATION 9
Possible with gastritis or IBS   Continue PPI BID   Continue Carafate   Avoid NSAIDs  Monitor bowel movement   Advance diet to lactose free soft diet   Last EGD 1/2023   If symptoms worsen will discuss the need for EGD Per patient, workup by her primary gastroenterologist reportedly unremarkable.  Continue PPI BID   Continue Carafate   Advised a trial of famotidine qhs (IV or PO)  Avoid NSAIDs  Monitor bowel movement   Advance diet to lactose free soft diet   Last EGD 1/2023

## 2023-11-13 NOTE — CONSULT NOTE ADULT - SUBJECTIVE AND OBJECTIVE BOX
INTERVAL HPI/OVERNIGHT EVENTS:  HPI:  Patient is an 82 year old F wih a PMHx of GERD, gastric ulcers/gastritis, HTN, breast cancer s/p radiation tx in  presenting to  ED for epigastric pain x3 weeks and frandy colored stools. Patient reports she has had this pain several times in the past but is now refractory to Protonix. Patient states she has lost nearly 10 lbs in the last month 2/2 to epigastric pain and ensuing decreased PO tolerance. She also notes frandy colored stools that "float" over the last several days. Symptoms associated with abdominal distention and significant belching. She denies any diarrhea/constipation, melena, hematemesis/hematochezia.     Of note patient states her mother  from breast cancer and several of her siblings  from breast and colon cancer. She reports her last colonoscopy was 3 years ago and was normal at which point she was told she owuld likely not require any more colonoscopies.     In the ED patient had a troponin of 5 that increased to 10 however she has no chest pain, no fevers/chills, LOC, palpitations, SOB, LE Edema.  (2023 15:59)    GI Consultation for epigastric pain for 4-6 week, gas and bloating. Patient seen and examined at bed side, has epigastric discomfort . She has history of GERD, gastric ulcer was taking PPI as needed. She is gassy and feels bloated all the time. She is burping all the time. She tried all over the counter medication including FD guard and IB guard nothing works. Her Gastroenterologist started her on Xanax she only takes once a day and that helped her . Denies ETOH, or NSAIDs use.   She think Protonix IV is helping her better than oral Protonix. She moves bowel daily abdominal discomfort not resolving with bowel movement. She denies nausea, vomiting, diarrhea hematochezia or melena.   Her colonoscopy 3 years before and EGD 2023 at [facility], as per patient all results were normal.        MEDICATIONS  (STANDING):  amLODIPine   Tablet 5 milliGRAM(s) Oral daily  atorvastatin 20 milliGRAM(s) Oral at bedtime  enoxaparin Injectable 40 milliGRAM(s) SubCutaneous every 24 hours  pantoprazole  Injectable 40 milliGRAM(s) IV Push two times a day  sodium chloride 0.9%. 1000 milliLiter(s) (50 mL/Hr) IV Continuous <Continuous>  sucralfate suspension 1 Gram(s) Oral four times a day    MEDICATIONS  (PRN):  acetaminophen     Tablet .. 650 milliGRAM(s) Oral every 6 hours PRN Temp greater or equal to 38C (100.4F), Mild Pain (1 - 3)  aluminum hydroxide/magnesium hydroxide/simethicone Suspension 30 milliLiter(s) Oral every 4 hours PRN Dyspepsia  melatonin 3 milliGRAM(s) Oral at bedtime PRN Insomnia  metoclopramide Injectable 5 milliGRAM(s) IV Push every 8 hours PRN if persistent nausea despite zofran  metoprolol tartrate Injectable 5 milliGRAM(s) IV Push every 8 hours PRN if SBP > 160  ondansetron Injectable 4 milliGRAM(s) IV Push every 8 hours PRN Nausea and/or Vomiting      Allergies    apples, pears peaches figs plums fruit skin      Itchy mouth and tongue (Other)  No Known Drug Allergies    Intolerances    codeine (Vomiting)  epinephrine (Other)      PAST MEDICAL & SURGICAL HISTORY:  HTN (hypertension)      Sprain rotator cuff  left, had surgery      GERD (gastroesophageal reflux disease)      Gastric ulcer        Breast cancer  in - received radiation treatment      Basal cell carcinoma  of face and chest - removed      Uterine fibroid  S/P hysterectomy      Degenerative disc disease, lumbar      Osteoarthritis of right knee      S/P hysterectomy  in       S/P lumpectomy of breast  left in       H/O repair of left rotator cuff        History of tonsillectomy  8 y/o          REVIEW OF SYSTEMS	  See HPI     PHYSICAL EXAM:   Vital Signs:  Vital Signs Last 24 Hrs  T(C): 36.6 (2023 04:55), Max: 36.6 (2023 19:25)  T(F): 97.9 (2023 04:55), Max: 97.9 (2023 19:25)  HR: 67 (2023 04:55) (67 - 85)  BP: 145/72 (2023 04:55) (135/67 - 165/83)  BP(mean): 96 (2023 04:55) (95 - 110)  RR: 18 (2023 04:55) (17 - 18)  SpO2: 99% (2023 04:55) (99% - 100%)    Parameters below as of 2023 04:55  Patient On (Oxygen Delivery Method): room air      Daily Height in cm: 154.94 (2023 19:25)    Daily I&O's Summary    2023 07:01  -  2023 11:29  --------------------------------------------------------  IN: 200 mL / OUT: 0 mL / NET: 200 mL        GENERAL:  Appears stated age  HEENT:  NC/AT,  conjunctivae clear and pink  CHEST:  Full & symmetric excursion  HEART:  Regular rhythm, S1, S2  ABDOMEN:  Soft, epigastric tender, non-distended, normoactive bowel sounds  EXTREMITIES:  no cyanosis, clubbing or edema  SKIN:  No rash/warm/dry  NEURO:  Alert, oriented      LABS:                        13.4   6.94  )-----------( 216      ( 2023 06:17 )             41.6     -    142  |  105  |  8   ----------------------------<  108<H>  3.5   |  29  |  0.78    Ca    9.1      2023 06:17    TPro  6.2  /  Alb  3.1<L>  /  TBili  0.6  /  DBili  x   /  AST  14  /  ALT  20  /  AlkPhos  60  11-13    PT/INR - ( 2023 10:05 )   PT: 11.3 sec;   INR: 0.99 ratio         PTT - ( 2023 10:05 )  PTT:30.9 sec  Urinalysis Basic - ( 2023 06:17 )    Color: x / Appearance: x / SG: x / pH: x  Gluc: 108 mg/dL / Ketone: x  / Bili: x / Urobili: x   Blood: x / Protein: x / Nitrite: x   Leuk Esterase: x / RBC: x / WBC x   Sq Epi: x / Non Sq Epi: x / Bacteria: x      amylase   lipaseLipase: 43 U/L ( @ 10:05)    RADIOLOGY & ADDITIONAL TESTS:    ACC: [id number] EXAM:  CT ABDOMEN AND PELVIS IC   ORDERED BY: BLANCA CRAIN     PROCEDURE DATE:  2023          INTERPRETATION:  CLINICAL INFORMATION: Abdominal pain, likely stool    COMPARISON: CT 2020    CONTRAST/COMPLICATIONS:  IV Contrast: Omnipaque 350  90 cc administered   10 cc discarded  Oral Contrast: NONE  Complications: None reported at time of study completion    PROCEDURE:  CT of the Abdomen and Pelvis was performed.  Sagittal and coronal reformats were performed.    FINDINGS:  LOWER CHEST: Within normal limits.    LIVER: Within normal limits.  BILE DUCTS: Normal caliber.  GALLBLADDER: Within normal limits.  SPLEEN: Within normal limits.  PANCREAS: Within normal limits.  ADRENALS: Within normal limits.  KIDNEYS/URETERS: Numerous bilateral renal cysts. Right lower pole renal   cortical scarring. No hydronephrosis.    BLADDER: Within normal limits.  REPRODUCTIVE ORGANS: Hysterectomy.    BOWEL: No bowel obstruction. Appendix is normal.  PERITONEUM: No ascites.  VESSELS: Within normal limits.  RETROPERITONEUM/LYMPH NODES: No lymphadenopathy.  ABDOMINAL WALL: Within normal limits.  BONES: Within normal limits.    IMPRESSION:  No acute pathology in the abdomen or pelvis.        --- End of Report ---      ASHER MARY DO; Attending Radiologist  This document has been electronically signed. 2023 12:26PM   GI Consult    Patient is an 82 year old F wih a PMHx of GERD, gastric ulcers/gastritis, HTN, breast cancer s/p radiation tx in  presenting to  ED for epigastric pain x3 weeks and reported frandy colored stools. Patient reports she has had this pain several times in the past but is now refractory to Protonix. Patient states she has lost nearly 10 lbs in the last month 2/2 to epigastric pain and ensuing decreased PO tolerance. She also notes frandy colored stools that "float" over the last several days. Symptoms associated with abdominal distention and significant belching. She denies any diarrhea/constipation, melena, hematemesis/hematochezia.     Of note patient states her mother  from breast cancer and several of her siblings  from breast and colon cancer. She reports her last colonoscopy was 3 years ago and was normal at which point she was told she would likely not require any more colonoscopies. Gastroenterologist Dr. Jhonatan Eid.    In the ED patient had a troponin of 5 that increased to 10 however she has no chest pain, no fevers/chills, LOC, palpitations, SOB, LE Edema.  (2023 15:59)    Patient seen and examined at bedside, has epigastric discomfort . She has history of GERD, ?gastric ulcer was taking PPI 'as needed'. She is gassy and feels bloated all the time and reports burping all the time, and feeling esophageal "spasms". She tried over the counter medication including FDgard and IBgard and reports that these were ineffective. She reports her outpatient gastroenterologist Dr. Eid started her on Xanax which she reports taking once a day with relief. Denies ETOH, or NSAIDs use.   She think Protonix IV is helping her more  than oral Protonix. She moves bowels daily abdominal discomfort not resolving with bowel movement. She denies nausea, vomiting, diarrhea hematochezia or melena.   Her colonoscopy 3 years before and EGD 2023 at [facility] and as per patient all results were normal.        MEDICATIONS  (STANDING):  amLODIPine   Tablet 5 milliGRAM(s) Oral daily  atorvastatin 20 milliGRAM(s) Oral at bedtime  enoxaparin Injectable 40 milliGRAM(s) SubCutaneous every 24 hours  pantoprazole  Injectable 40 milliGRAM(s) IV Push two times a day  sodium chloride 0.9%. 1000 milliLiter(s) (50 mL/Hr) IV Continuous <Continuous>  sucralfate suspension 1 Gram(s) Oral four times a day    MEDICATIONS  (PRN):  acetaminophen     Tablet .. 650 milliGRAM(s) Oral every 6 hours PRN Temp greater or equal to 38C (100.4F), Mild Pain (1 - 3)  aluminum hydroxide/magnesium hydroxide/simethicone Suspension 30 milliLiter(s) Oral every 4 hours PRN Dyspepsia  melatonin 3 milliGRAM(s) Oral at bedtime PRN Insomnia  metoclopramide Injectable 5 milliGRAM(s) IV Push every 8 hours PRN if persistent nausea despite zofran  metoprolol tartrate Injectable 5 milliGRAM(s) IV Push every 8 hours PRN if SBP > 160  ondansetron Injectable 4 milliGRAM(s) IV Push every 8 hours PRN Nausea and/or Vomiting      Allergies    apples, pears peaches figs plums fruit skin      Itchy mouth and tongue (Other)  No Known Drug Allergies    Intolerances    codeine (Vomiting)  epinephrine (Other)      PAST MEDICAL & SURGICAL HISTORY:  HTN (hypertension)      Sprain rotator cuff  left, had surgery      GERD (gastroesophageal reflux disease)      Gastric ulcer        Breast cancer  in - received radiation treatment      Basal cell carcinoma  of face and chest - removed      Uterine fibroid  S/P hysterectomy      Degenerative disc disease, lumbar      Osteoarthritis of right knee      S/P hysterectomy  in       S/P lumpectomy of breast  left in       H/O repair of left rotator cuff        History of tonsillectomy  10 y/o          REVIEW OF SYSTEMS	  See HPI     PHYSICAL EXAM:   Vital Signs:  Vital Signs Last 24 Hrs  T(C): 36.6 (2023 04:55), Max: 36.6 (2023 19:25)  T(F): 97.9 (2023 04:55), Max: 97.9 (2023 19:25)  HR: 67 (2023 04:55) (67 - 85)  BP: 145/72 (2023 04:55) (135/67 - 165/83)  BP(mean): 96 (2023 04:55) (95 - 110)  RR: 18 (2023 04:55) (17 - 18)  SpO2: 99% (2023 04:55) (99% - 100%)    Parameters below as of 2023 04:55  Patient On (Oxygen Delivery Method): room air      Daily Height in cm: 154.94 (2023 19:25)    Daily I&O's Summary    2023 07:01  -  2023 11:29  --------------------------------------------------------  IN: 200 mL / OUT: 0 mL / NET: 200 mL        GENERAL:  Appears stated age, NAD  HEENT:  NC/AT,  conjunctivae clear and pink  CHEST:  Full & symmetric excursion  HEART:  Regular rhythm, S1, S2  ABDOMEN:  Soft, epigastric tenderness, non-distended, normoactive bowel sounds  EXTREMITIES:  no cyanosis, clubbing or edema  SKIN:  No rash/warm/dry  NEURO:  Alert, oriented      LABS:                        13.4   6.94  )-----------( 216      ( 2023 06:17 )             41.6     -    142  |  105  |  8   ----------------------------<  108<H>  3.5   |  29  |  0.78    Ca    9.1      2023 06:17    TPro  6.2  /  Alb  3.1<L>  /  TBili  0.6  /  DBili  x   /  AST  14  /  ALT  20  /  AlkPhos  60  11-13    PT/INR - ( 2023 10:05 )   PT: 11.3 sec;   INR: 0.99 ratio         PTT - ( 2023 10:05 )  PTT:30.9 sec  Urinalysis Basic - ( 2023 06:17 )    Color: x / Appearance: x / SG: x / pH: x  Gluc: 108 mg/dL / Ketone: x  / Bili: x / Urobili: x   Blood: x / Protein: x / Nitrite: x   Leuk Esterase: x / RBC: x / WBC x   Sq Epi: x / Non Sq Epi: x / Bacteria: x      Lipase: 43 U/L ( @ 10:05)        RADIOLOGY & ADDITIONAL TESTS:    ACC: [id number] EXAM:  CT ABDOMEN AND PELVIS IC   ORDERED BY: BLANCA   [email or web address]     PROCEDURE DATE:  2023          INTERPRETATION:  CLINICAL INFORMATION: Abdominal pain, likely stool    COMPARISON: CT 2020    CONTRAST/COMPLICATIONS:  IV Contrast: Omnipaque 350  90 cc administered   10 cc discarded  Oral Contrast: NONE  Complications: None reported at time of study completion    PROCEDURE:  CT of the Abdomen and Pelvis was performed.  Sagittal and coronal reformats were performed.    FINDINGS:  LOWER CHEST: Within normal limits.    LIVER: Within normal limits.  BILE DUCTS: Normal caliber.  GALLBLADDER: Within normal limits.  SPLEEN: Within normal limits.  PANCREAS: Within normal limits.  ADRENALS: Within normal limits.  KIDNEYS/URETERS: Numerous bilateral renal cysts. Right lower pole renal   cortical scarring. No hydronephrosis.    BLADDER: Within normal limits.  REPRODUCTIVE ORGANS: Hysterectomy.    BOWEL: No bowel obstruction. Appendix is normal.  PERITONEUM: No ascites.  VESSELS: Within normal limits.  RETROPERITONEUM/LYMPH NODES: No lymphadenopathy.  ABDOMINAL WALL: Within normal limits.  BONES: Within normal limits.    IMPRESSION:  No acute pathology in the abdomen or pelvis.        --- End of Report ---      ASHER MARY DO; Attending Radiologist  This document has been electronically signed. 2023 12:26PM

## 2023-11-13 NOTE — CONSULT NOTE ADULT - NS ATTEND AMEND GEN_ALL_CORE FT
Patient seen and examined at the bedside. Agree with the assessment and plan of CHECO Gibson.  Longstanding nonspecific GI complaints. Per patient extensive w/up with primary GI Dr. Eid largely unrevealing.  Suggest qhs famotidine (IV or PO per her preference). Continue PPI.  No indication for inpatient EGD at present. Suggest outpatient evaluation, consider esophageal motility evaluation with her primary gastroenterologist due to her concern for having esophageal spasm. She agrees.

## 2023-11-13 NOTE — PROGRESS NOTE ADULT - SUBJECTIVE AND OBJECTIVE BOX
Patient is a 83y old  Female who presents with a chief complaint of Epigastric Pain (13 Nov 2023 10:35)      Subjective and overnight events:  Patient seen and examined at bedside. pt reports persistent severe epigastric pain a/w nausea.  tolerated water but reports pain with oral medications and requesting for IV med only. no fever, chills, sob, cp.     ALLERGIES:  apples, pears peaches figs plums fruit skin       Itchy mouth and tongue (Other)  codeine (Vomiting)  epinephrine (Other)  No Known Drug Allergies    MEDICATIONS  (STANDING):  amLODIPine   Tablet 5 milliGRAM(s) Oral daily  atorvastatin 20 milliGRAM(s) Oral at bedtime  enoxaparin Injectable 40 milliGRAM(s) SubCutaneous every 24 hours  pantoprazole  Injectable 40 milliGRAM(s) IV Push two times a day  sodium chloride 0.9%. 1000 milliLiter(s) (50 mL/Hr) IV Continuous <Continuous>  sucralfate suspension 1 Gram(s) Oral four times a day    MEDICATIONS  (PRN):  acetaminophen     Tablet .. 650 milliGRAM(s) Oral every 6 hours PRN Temp greater or equal to 38C (100.4F), Mild Pain (1 - 3)  aluminum hydroxide/magnesium hydroxide/simethicone Suspension 30 milliLiter(s) Oral every 4 hours PRN Dyspepsia  melatonin 3 milliGRAM(s) Oral at bedtime PRN Insomnia  metoclopramide Injectable 5 milliGRAM(s) IV Push every 8 hours PRN if persistent nausea despite zofran  metoprolol tartrate Injectable 5 milliGRAM(s) IV Push every 8 hours PRN if SBP > 160  ondansetron Injectable 4 milliGRAM(s) IV Push every 8 hours PRN Nausea and/or Vomiting    Vital Signs Last 24 Hrs  T(F): 97.9 (13 Nov 2023 04:55), Max: 97.9 (12 Nov 2023 19:25)  HR: 67 (13 Nov 2023 04:55) (67 - 85)  BP: 145/72 (13 Nov 2023 04:55) (135/67 - 165/83)  RR: 18 (13 Nov 2023 04:55) (17 - 18)  SpO2: 99% (13 Nov 2023 04:55) (99% - 100%)  I&O's Summary    13 Nov 2023 07:01  -  13 Nov 2023 11:14  --------------------------------------------------------  IN: 200 mL / OUT: 0 mL / NET: 200 mL      PHYSICAL EXAM:  General: NAD, A/O x 3  ENT: MMM  Neck: Supple, No JVD  Lungs: Clear to auscultation bilaterally  Cardio: RRR, S1/S2, No murmurs  Abdomen: Soft, epigastric tenderness, Nondistended; Bowel sounds present  Extremities: No calf tenderness, No pitting edema    LABS:                        13.4   6.94  )-----------( 216      ( 13 Nov 2023 06:17 )             41.6     11-13    142  |  105  |  8   ----------------------------<  108  3.5   |  29  |  0.78    Ca    9.1      13 Nov 2023 06:17    TPro  6.2  /  Alb  3.1  /  TBili  0.6  /  DBili  x   /  AST  14  /  ALT  20  /  AlkPhos  60  11-13      PT/INR - ( 12 Nov 2023 10:05 )   PT: 11.3 sec;   INR: 0.99 ratio         PTT - ( 12 Nov 2023 10:05 )  PTT:30.9 sec    CARDIAC MARKERS ( 12 Nov 2023 18:00 )  x     / 12.4 ng/L / x     / x     / x      CARDIAC MARKERS ( 12 Nov 2023 14:25 )  x     / 10.9 ng/L / x     / x     / x      CARDIAC MARKERS ( 12 Nov 2023 10:05 )  x     / 5.8 ng/L / x     / x     / x          11-13 Chol 177 mg/dL LDL -- HDL 49 mg/dL Trig 93 mg/dL      Urinalysis Basic - ( 13 Nov 2023 06:17 )    Color: x / Appearance: x / SG: x / pH: x  Gluc: 108 mg/dL / Ketone: x  / Bili: x / Urobili: x   Blood: x / Protein: x / Nitrite: x   Leuk Esterase: x / RBC: x / WBC x   Sq Epi: x / Non Sq Epi: x / Bacteria: x            RADIOLOGY & ADDITIONAL TESTS:    Care Discussed with Consultants/Other Providers:

## 2023-11-13 NOTE — CONSULT NOTE ADULT - CONSULT REASON
patient with epigastric abdominal pain with hx of gastritis and gastric ulcers found to have elevated trops patient with epigastric abdominal pain with hx of gastritis and gastric ulcers found to have elevated troponins

## 2023-11-13 NOTE — CONSULT NOTE ADULT - NS ATTEND AMEND GEN_ALL_CORE FT
I have seen and examined the patient. I have discussed case with CHECO Carmona.    Deya Alcantara is an 83 year old woman with past medical history of Breast cancer (s/p remote radiation therapy), GERD and Gastric ulcer who presented with epigastric pain, suggestive of gastritis.    The patient reports a longstanding history of gastric ulcer and gastritis pain with eating foods, she reports that this has recurred and also associated with nausea and difficulty swallowing pills. She denies exertional angina or dyspnea. ECG consistent with normal sinus rhythm, no acute ischemic ST abnormalities and troponins negative x 3, no signs of an acute coronary syndrome. Telemetry consistent with sinus rhythm and PVC's. Patient reports recent normal nuclear stress test with her cardiologist, overall symptoms not consistent with an acute coronary syndrome, will check echocardiogram and recommend obtaining recent nuclear stress test report. Follow up GI consult.      Ramesh Luna MD  Cardiology I have seen and examined the patient. I have discussed case with CHECO Carmona.    Deya Alcantara is an 83 year old woman with past medical history of Breast cancer (s/p remote radiation therapy), GERD and Gastric ulcer who presented with epigastric pain, suggestive of gastritis.    The patient reports a longstanding history of gastric ulcer and gastritis pain with eating foods, she reports that this has recurred and also associated with nausea and difficulty swallowing pills. She denies exertional angina or dyspnea. ECG consistent with normal sinus rhythm, no acute ischemic ST abnormalities and troponins negative x 3, no signs of an acute coronary syndrome. Telemetry consistent with sinus rhythm and PVC's. Patient reports recent normal nuclear stress test with her cardiologist, overall symptoms not consistent with an acute coronary syndrome, will check echocardiogram and recommend obtaining recent nuclear stress test report. Follow up GI consult.    Addendum:  Outpatient nuclear stress test report (8/2023): No ischemia. Artifact or scar present. Medical management recommended.      Ramesh Luna MD  Cardiology

## 2023-11-13 NOTE — CONSULT NOTE ADULT - ASSESSMENT
Patient is an 82 year old F wih a PMHx of GERD, gastric ulcers/gastritis, HTN, breast cancer s/p radiation tx in 1985.     GI Consultation for epigastric pain for 4-6 week, gas and bloating.     Patient is an 82 year old F wih a PMHx of GERD, gastric ulcers/gastritis, HTN, breast cancer s/p radiation tx in 1985, with epigastric pain, bloating, gas, "spasm" in chest refractory to PPI

## 2023-11-13 NOTE — CONSULT NOTE ADULT - SUBJECTIVE AND OBJECTIVE BOX
PRAVEENA LENNONJOHNY  [id number]      HPI:  Patient is an 82 year old F wih a PMHx of GERD, gastric ulcers/gastritis, HTN, breast cancer s/p radiation tx in  presenting to  ED for epigastric pain x3 weeks and frandy colored stools. Patient reports she has had this pain several times in the past but is now refractory to Protonix. Patient states she has lost nearly 10 lbs in the last month 2/2 to epigastric pain and ensuing decreased PO tolerance. She also notes frandy colored stools that "float" over the last several days. Symptoms associated with abdominal distention and significant belching. She denies any diarrhea/constipation, melena, hematemesis/hematochezia.     Of note patient states her mother  from breast cancer and several of her siblings  from breast and colon cancer. She reports her last colonoscopy was 3 years ago and was normal at which point she was told she owuld likely not require any more colonoscopies.     In the ED patient had a troponin of 5 that increased to 10 however she has no chest pain, no fevers/chills, LOC, palpitations, SOB, LE Edema.  (2023 15:59)    ALLERGIES:  apples, pears peaches figs plums fruit skin      Itchy mouth and tongue (Other)  codeine (Vomiting)  epinephrine (Other)  No Known Drug Allergies      PAST MEDICAL & SURGICAL HISTORY:  HTN (hypertension)      Sprain rotator cuff  left, had surgery      GERD (gastroesophageal reflux disease)      Gastric ulcer        Breast cancer  in - received radiation treatment      Basal cell carcinoma  of face and chest - removed      Uterine fibroid  S/P hysterectomy      Degenerative disc disease, lumbar      Osteoarthritis of right knee      S/P hysterectomy  in       S/P lumpectomy of breast  left in       H/O repair of left rotator cuff        History of tonsillectomy  10 y/o      MEDICATIONS  (STANDING):  amLODIPine   Tablet 5 milliGRAM(s) Oral daily  atorvastatin 20 milliGRAM(s) Oral at bedtime  pantoprazole  Injectable 40 milliGRAM(s) IV Push two times a day  sodium chloride 0.9%. 1000 milliLiter(s) (50 mL/Hr) IV Continuous <Continuous>  sucralfate suspension 1 Gram(s) Oral four times a day    MEDICATIONS  (PRN):  acetaminophen     Tablet .. 650 milliGRAM(s) Oral every 6 hours PRN Temp greater or equal to 38C (100.4F), Mild Pain (1 - 3)  ALPRAZolam 0.5 milliGRAM(s) Oral every 12 hours PRN anxiety  aluminum hydroxide/magnesium hydroxide/simethicone Suspension 30 milliLiter(s) Oral every 4 hours PRN Dyspepsia  melatonin 3 milliGRAM(s) Oral at bedtime PRN Insomnia  metoclopramide Injectable 5 milliGRAM(s) IV Push every 8 hours PRN if persistent nausea despite zofran  metoprolol tartrate Injectable 5 milliGRAM(s) IV Push every 8 hours PRN if SBP > 160  ondansetron Injectable 4 milliGRAM(s) IV Push every 8 hours PRN Nausea and/or Vomiting    SOCIAL HISTORY:      FAMILY HISTORY:  Family history of hypertension in mother  also colon cancer    Family history of lung cancer  father -  age 61- lung ca    FH: colon cancer (Sibling)        ROS:  All 10 systems reviewed and positives noted in HPI    OBJECTIVE:    VITAL SIGNS:  Vital Signs Last 24 Hrs  T(C): 36.6 (2023 04:55), Max: 36.6 (2023 19:25)  T(F): 97.9 (2023 04:55), Max: 97.9 (2023 19:25)  HR: 67 (2023 04:55) (67 - 85)  BP: 145/72 (2023 04:55) (135/67 - 165/83)  BP(mean): 96 (2023 04:55) (95 - 110)  RR: 18 (2023 04:55) (17 - 18)  SpO2: 99% (2023 04:55) (99% - 100%)    Parameters below as of 2023 04:55  Patient On (Oxygen Delivery Method): room air        PHYSICAL EXAM:  General: well appearing, no distress  HEENT: sclera anicteric  Neck: supple, no carotid bruits b/l  CVS: JVP ~ 7 cm H20, RRR, s1, s2, no murmurs/rubs/gallops  Chest: unlabored respirations, clear to auscultation b/l  Abdomen: non-distended  Extremities: no lower extremity edema b/l  Neuro: awake, alert & oriented x 3  Psych: normal affect      LABS:                        13.4   6.94  )-----------( 216      ( 2023 06:17 )             41.6     11-13    142  |  105  |  8   ----------------------------<  108<H>  3.5   |  29  |  0.78    Ca    9.1      2023 06:17    TPro  6.2  /  Alb  3.1<L>  /  TBili  0.6  /  DBili  x   /  AST  14  /  ALT  20  /  AlkPhos  60  11-13        PT/INR - ( 2023 10:05 )   PT: 11.3 sec;   INR: 0.99 ratio         PTT - ( 2023 10:05 )  PTT:30.9 sec      ECG: NSR, no changes from prior ekg       TTE: < from: Transthoracic Echocardiogram w/ Doppler (09 @ 21:31) >  1. Normal left atrium.  2. Normal left ventricular internal dimensions and wall  thickness.  3. Normal left ventricular systolic function. EF 69%. No  Segmental wall motion abnormalities  4. Normal right atrium.  5. Normal right ventricular size and systolic function.  6. Minimal mitral regurgitation.  7. Minimal tricuspid regurgitation. Estimated pulmonary  artery systolic pressure equals 33 mm Hg, assuming right  atrial pressure equals 10  mm Hg.    STRESS TEST:  < from: Nuclear stress test, exercise (08 @ 09:24) >  IMPRESSIONS: Normal Scan/Abnormal ECG Study  * Positive maximal exercise treadmill test.  * Good exercise performance.  * HR Response: Appropriate  * BP Response: Appropriate  * Myocardial Perfusion SPECT results are normal at 108 %  of MPHR.  * There is a small, moderate to severe defect in distal  anterior wall that is fixed, suggestive of breast  attenuation.  * These defects resolve with prone imaging.  * Gated wall motion analysis is performed, and shows  normal wall motion, with calculated LV ejection fraction  of 60%, which is normal.    < end of copied text >       PRAVEENA MARTINEZ  [id number]      HPI:  Patient is an 82 year old F with a PMHx of GERD, gastric ulcers/gastritis, HTN, breast cancer s/p radiation tx in  presenting to  ED for epigastric pain x3 weeks and frandy colored stools. Patient reports she has had this pain several times in the past but is now refractory to Protonix. Patient states she has lost nearly 10 lbs in the last month 2/2 to epigastric pain and ensuing decreased PO tolerance. She also notes frandy colored stools that "float" over the last several days. Symptoms associated with abdominal distention and significant belching. She denies any diarrhea/constipation, melena, hematemesis/hematochezia.     Of note patient states her mother  from breast cancer and several of her siblings  from breast and colon cancer. She reports her last colonoscopy was 3 years ago and was normal at which point she was told she would likely not require any more colonoscopies.     In the ED patient had a troponin of 5 that increased to 10 however she has no chest pain, no fevers/chills, LOC, palpitations, SOB, LE Edema.  (2023 15:59)    ALLERGIES:  apples, pears peaches figs plums fruit skin      Itchy mouth and tongue (Other)  codeine (Vomiting)  epinephrine (Other)  No Known Drug Allergies      PAST MEDICAL & SURGICAL HISTORY:  HTN (hypertension)  GERD (gastroesophageal reflux disease)  Gastric ulcer  Breast cancer  S/P hysterectomy  S/P lumpectomy of breast  H/O repair of left rotator cuff  History of tonsillectomy      MEDICATIONS  (STANDING):  amLODIPine   Tablet 5 milliGRAM(s) Oral daily  atorvastatin 20 milliGRAM(s) Oral at bedtime  pantoprazole  Injectable 40 milliGRAM(s) IV Push two times a day  sodium chloride 0.9%. 1000 milliLiter(s) (50 mL/Hr) IV Continuous <Continuous>  sucralfate suspension 1 Gram(s) Oral four times a day    MEDICATIONS  (PRN):  acetaminophen     Tablet .. 650 milliGRAM(s) Oral every 6 hours PRN Temp greater or equal to 38C (100.4F), Mild Pain (1 - 3)  ALPRAZolam 0.5 milliGRAM(s) Oral every 12 hours PRN anxiety  aluminum hydroxide/magnesium hydroxide/simethicone Suspension 30 milliLiter(s) Oral every 4 hours PRN Dyspepsia  melatonin 3 milliGRAM(s) Oral at bedtime PRN Insomnia  metoclopramide Injectable 5 milliGRAM(s) IV Push every 8 hours PRN if persistent nausea despite zofran  metoprolol tartrate Injectable 5 milliGRAM(s) IV Push every 8 hours PRN if SBP > 160  ondansetron Injectable 4 milliGRAM(s) IV Push every 8 hours PRN Nausea and/or Vomiting      ROS:  All 10 systems reviewed and positives noted in HPI    OBJECTIVE:    VITAL SIGNS:  Vital Signs Last 24 Hrs  T(C): 36.6 (2023 04:55), Max: 36.6 (2023 19:25)  T(F): 97.9 (2023 04:55), Max: 97.9 (2023 19:25)  HR: 67 (2023 04:55) (67 - 85)  BP: 145/72 (2023 04:55) (135/67 - 165/83)  BP(mean): 96 (2023 04:55) (95 - 110)  RR: 18 (2023 04:55) (17 - 18)  SpO2: 99% (2023 04:55) (99% - 100%)    Parameters below as of 2023 04:55  Patient On (Oxygen Delivery Method): room air        PHYSICAL EXAM:  General: well appearing, no distress  HEENT: sclera anicteric  Neck: supple  CVS: JVP ~ 7 cm H20, RRR, s1, s2, no murmurs/rubs/gallops  Chest: unlabored respirations, clear to auscultation b/l  Abdomen: non-distended  Extremities: no lower extremity edema b/l  Neuro: awake, alert & oriented   Psych: normal affect      LABS:                        13.4   6.94  )-----------( 216      ( 2023 06:17 )             41.6     11-13    142  |  105  |  8   ----------------------------<  108<H>  3.5   |  29  |  0.78    Ca    9.1      2023 06:17    TPro  6.2  /  Alb  3.1<L>  /  TBili  0.6  /  DBili  x   /  AST  14  /  ALT  20  /  AlkPhos  60  11-13        PT/INR - ( 2023 10:05 )   PT: 11.3 sec;   INR: 0.99 ratio         PTT - ( 2023 10:05 )  PTT:30.9 sec      ECG: NSR, no changes from prior ekg       TTE: < from: Transthoracic Echocardiogram w/ Doppler (09 @ 21:31) >  1. Normal left atrium.  2. Normal left ventricular internal dimensions and wall  thickness.  3. Normal left ventricular systolic function. EF 69%. No  Segmental wall motion abnormalities  4. Normal right atrium.  5. Normal right ventricular size and systolic function.  6. Minimal mitral regurgitation.  7. Minimal tricuspid regurgitation. Estimated pulmonary  artery systolic pressure equals 33 mm Hg, assuming right  atrial pressure equals 10  mm Hg.

## 2023-11-14 ENCOUNTER — TRANSCRIPTION ENCOUNTER (OUTPATIENT)
Age: 83
End: 2023-11-14

## 2023-11-14 VITALS
OXYGEN SATURATION: 97 % | RESPIRATION RATE: 16 BRPM | TEMPERATURE: 98 F | SYSTOLIC BLOOD PRESSURE: 148 MMHG | HEART RATE: 74 BPM | DIASTOLIC BLOOD PRESSURE: 85 MMHG

## 2023-11-14 LAB
ANION GAP SERPL CALC-SCNC: 5 MMOL/L — SIGNIFICANT CHANGE UP (ref 5–17)
ANION GAP SERPL CALC-SCNC: 5 MMOL/L — SIGNIFICANT CHANGE UP (ref 5–17)
BASOPHILS # BLD AUTO: 0.03 K/UL — SIGNIFICANT CHANGE UP (ref 0–0.2)
BASOPHILS # BLD AUTO: 0.03 K/UL — SIGNIFICANT CHANGE UP (ref 0–0.2)
BASOPHILS NFR BLD AUTO: 0.5 % — SIGNIFICANT CHANGE UP (ref 0–2)
BASOPHILS NFR BLD AUTO: 0.5 % — SIGNIFICANT CHANGE UP (ref 0–2)
BUN SERPL-MCNC: 8 MG/DL — SIGNIFICANT CHANGE UP (ref 7–23)
BUN SERPL-MCNC: 8 MG/DL — SIGNIFICANT CHANGE UP (ref 7–23)
CALCIUM SERPL-MCNC: 9.1 MG/DL — SIGNIFICANT CHANGE UP (ref 8.4–10.5)
CALCIUM SERPL-MCNC: 9.1 MG/DL — SIGNIFICANT CHANGE UP (ref 8.4–10.5)
CHLORIDE SERPL-SCNC: 105 MMOL/L — SIGNIFICANT CHANGE UP (ref 96–108)
CHLORIDE SERPL-SCNC: 105 MMOL/L — SIGNIFICANT CHANGE UP (ref 96–108)
CO2 SERPL-SCNC: 30 MMOL/L — SIGNIFICANT CHANGE UP (ref 22–31)
CO2 SERPL-SCNC: 30 MMOL/L — SIGNIFICANT CHANGE UP (ref 22–31)
CREAT SERPL-MCNC: 0.77 MG/DL — SIGNIFICANT CHANGE UP (ref 0.5–1.3)
CREAT SERPL-MCNC: 0.77 MG/DL — SIGNIFICANT CHANGE UP (ref 0.5–1.3)
EGFR: 77 ML/MIN/1.73M2 — SIGNIFICANT CHANGE UP
EGFR: 77 ML/MIN/1.73M2 — SIGNIFICANT CHANGE UP
EOSINOPHIL # BLD AUTO: 0.16 K/UL — SIGNIFICANT CHANGE UP (ref 0–0.5)
EOSINOPHIL # BLD AUTO: 0.16 K/UL — SIGNIFICANT CHANGE UP (ref 0–0.5)
EOSINOPHIL NFR BLD AUTO: 2.8 % — SIGNIFICANT CHANGE UP (ref 0–6)
EOSINOPHIL NFR BLD AUTO: 2.8 % — SIGNIFICANT CHANGE UP (ref 0–6)
GLUCOSE SERPL-MCNC: 112 MG/DL — HIGH (ref 70–99)
GLUCOSE SERPL-MCNC: 112 MG/DL — HIGH (ref 70–99)
HCT VFR BLD CALC: 39.2 % — SIGNIFICANT CHANGE UP (ref 34.5–45)
HCT VFR BLD CALC: 39.2 % — SIGNIFICANT CHANGE UP (ref 34.5–45)
HGB BLD-MCNC: 12.9 G/DL — SIGNIFICANT CHANGE UP (ref 11.5–15.5)
HGB BLD-MCNC: 12.9 G/DL — SIGNIFICANT CHANGE UP (ref 11.5–15.5)
IMM GRANULOCYTES NFR BLD AUTO: 0.4 % — SIGNIFICANT CHANGE UP (ref 0–0.9)
IMM GRANULOCYTES NFR BLD AUTO: 0.4 % — SIGNIFICANT CHANGE UP (ref 0–0.9)
LYMPHOCYTES # BLD AUTO: 1.89 K/UL — SIGNIFICANT CHANGE UP (ref 1–3.3)
LYMPHOCYTES # BLD AUTO: 1.89 K/UL — SIGNIFICANT CHANGE UP (ref 1–3.3)
LYMPHOCYTES # BLD AUTO: 33.6 % — SIGNIFICANT CHANGE UP (ref 13–44)
LYMPHOCYTES # BLD AUTO: 33.6 % — SIGNIFICANT CHANGE UP (ref 13–44)
MCHC RBC-ENTMCNC: 30.8 PG — SIGNIFICANT CHANGE UP (ref 27–34)
MCHC RBC-ENTMCNC: 30.8 PG — SIGNIFICANT CHANGE UP (ref 27–34)
MCHC RBC-ENTMCNC: 32.9 GM/DL — SIGNIFICANT CHANGE UP (ref 32–36)
MCHC RBC-ENTMCNC: 32.9 GM/DL — SIGNIFICANT CHANGE UP (ref 32–36)
MCV RBC AUTO: 93.6 FL — SIGNIFICANT CHANGE UP (ref 80–100)
MCV RBC AUTO: 93.6 FL — SIGNIFICANT CHANGE UP (ref 80–100)
MONOCYTES # BLD AUTO: 0.36 K/UL — SIGNIFICANT CHANGE UP (ref 0–0.9)
MONOCYTES # BLD AUTO: 0.36 K/UL — SIGNIFICANT CHANGE UP (ref 0–0.9)
MONOCYTES NFR BLD AUTO: 6.4 % — SIGNIFICANT CHANGE UP (ref 2–14)
MONOCYTES NFR BLD AUTO: 6.4 % — SIGNIFICANT CHANGE UP (ref 2–14)
NEUTROPHILS # BLD AUTO: 3.16 K/UL — SIGNIFICANT CHANGE UP (ref 1.8–7.4)
NEUTROPHILS # BLD AUTO: 3.16 K/UL — SIGNIFICANT CHANGE UP (ref 1.8–7.4)
NEUTROPHILS NFR BLD AUTO: 56.3 % — SIGNIFICANT CHANGE UP (ref 43–77)
NEUTROPHILS NFR BLD AUTO: 56.3 % — SIGNIFICANT CHANGE UP (ref 43–77)
NRBC # BLD: 0 /100 WBCS — SIGNIFICANT CHANGE UP (ref 0–0)
NRBC # BLD: 0 /100 WBCS — SIGNIFICANT CHANGE UP (ref 0–0)
PLATELET # BLD AUTO: 216 K/UL — SIGNIFICANT CHANGE UP (ref 150–400)
PLATELET # BLD AUTO: 216 K/UL — SIGNIFICANT CHANGE UP (ref 150–400)
POTASSIUM SERPL-MCNC: 4 MMOL/L — SIGNIFICANT CHANGE UP (ref 3.5–5.3)
POTASSIUM SERPL-MCNC: 4 MMOL/L — SIGNIFICANT CHANGE UP (ref 3.5–5.3)
POTASSIUM SERPL-SCNC: 4 MMOL/L — SIGNIFICANT CHANGE UP (ref 3.5–5.3)
POTASSIUM SERPL-SCNC: 4 MMOL/L — SIGNIFICANT CHANGE UP (ref 3.5–5.3)
RBC # BLD: 4.19 M/UL — SIGNIFICANT CHANGE UP (ref 3.8–5.2)
RBC # BLD: 4.19 M/UL — SIGNIFICANT CHANGE UP (ref 3.8–5.2)
RBC # FLD: 12.6 % — SIGNIFICANT CHANGE UP (ref 10.3–14.5)
RBC # FLD: 12.6 % — SIGNIFICANT CHANGE UP (ref 10.3–14.5)
SODIUM SERPL-SCNC: 140 MMOL/L — SIGNIFICANT CHANGE UP (ref 135–145)
SODIUM SERPL-SCNC: 140 MMOL/L — SIGNIFICANT CHANGE UP (ref 135–145)
WBC # BLD: 5.62 K/UL — SIGNIFICANT CHANGE UP (ref 3.8–10.5)
WBC # BLD: 5.62 K/UL — SIGNIFICANT CHANGE UP (ref 3.8–10.5)
WBC # FLD AUTO: 5.62 K/UL — SIGNIFICANT CHANGE UP (ref 3.8–10.5)
WBC # FLD AUTO: 5.62 K/UL — SIGNIFICANT CHANGE UP (ref 3.8–10.5)

## 2023-11-14 PROCEDURE — 85730 THROMBOPLASTIN TIME PARTIAL: CPT

## 2023-11-14 PROCEDURE — 99233 SBSQ HOSP IP/OBS HIGH 50: CPT | Mod: FS

## 2023-11-14 PROCEDURE — 74177 CT ABD & PELVIS W/CONTRAST: CPT | Mod: MA

## 2023-11-14 PROCEDURE — 80061 LIPID PANEL: CPT

## 2023-11-14 PROCEDURE — 96365 THER/PROPH/DIAG IV INF INIT: CPT

## 2023-11-14 PROCEDURE — 96375 TX/PRO/DX INJ NEW DRUG ADDON: CPT

## 2023-11-14 PROCEDURE — 99239 HOSP IP/OBS DSCHRG MGMT >30: CPT

## 2023-11-14 PROCEDURE — 71045 X-RAY EXAM CHEST 1 VIEW: CPT

## 2023-11-14 PROCEDURE — 84484 ASSAY OF TROPONIN QUANT: CPT

## 2023-11-14 PROCEDURE — 93005 ELECTROCARDIOGRAM TRACING: CPT

## 2023-11-14 PROCEDURE — 87086 URINE CULTURE/COLONY COUNT: CPT

## 2023-11-14 PROCEDURE — 93306 TTE W/DOPPLER COMPLETE: CPT

## 2023-11-14 PROCEDURE — 80053 COMPREHEN METABOLIC PANEL: CPT

## 2023-11-14 PROCEDURE — 99285 EMERGENCY DEPT VISIT HI MDM: CPT

## 2023-11-14 PROCEDURE — 36415 COLL VENOUS BLD VENIPUNCTURE: CPT

## 2023-11-14 PROCEDURE — 83690 ASSAY OF LIPASE: CPT

## 2023-11-14 PROCEDURE — 85610 PROTHROMBIN TIME: CPT

## 2023-11-14 PROCEDURE — 85025 COMPLETE CBC W/AUTO DIFF WBC: CPT

## 2023-11-14 PROCEDURE — 80048 BASIC METABOLIC PNL TOTAL CA: CPT

## 2023-11-14 PROCEDURE — 83036 HEMOGLOBIN GLYCOSYLATED A1C: CPT

## 2023-11-14 PROCEDURE — 99232 SBSQ HOSP IP/OBS MODERATE 35: CPT

## 2023-11-14 PROCEDURE — 81001 URINALYSIS AUTO W/SCOPE: CPT

## 2023-11-14 RX ORDER — PANTOPRAZOLE SODIUM 20 MG/1
1 TABLET, DELAYED RELEASE ORAL
Qty: 60 | Refills: 0
Start: 2023-11-14 | End: 2023-12-13

## 2023-11-14 RX ORDER — ALPRAZOLAM 0.25 MG
1 TABLET ORAL
Qty: 6 | Refills: 0
Start: 2023-11-14

## 2023-11-14 RX ORDER — FAMOTIDINE 10 MG/ML
1 INJECTION INTRAVENOUS
Qty: 30 | Refills: 0
Start: 2023-11-14 | End: 2023-12-13

## 2023-11-14 RX ADMIN — PANTOPRAZOLE SODIUM 40 MILLIGRAM(S): 20 TABLET, DELAYED RELEASE ORAL at 06:16

## 2023-11-14 RX ADMIN — Medication 1 GRAM(S): at 06:48

## 2023-11-14 RX ADMIN — Medication 1 GRAM(S): at 11:27

## 2023-11-14 RX ADMIN — Medication 50 MILLIGRAM(S): at 06:17

## 2023-11-14 NOTE — PROGRESS NOTE ADULT - ASSESSMENT
83 year old female with past medical history of HTN, HLD, PUD/gastritis, esophageal spasm, who presented with ~1 month history of epigastric pain associated with nausea/increased belching and increased gas w/ orange colored stool. Epigastric pain is constant, non-radiating and is exacerbated with oral intake. She has been taking carafate BID and pantoprazole QD without significant improvement. Pt also reports 10 pound weight loss in 1 month.    Epigastric pain a/w nausea/vomiting and weight loss- suspect severe gastritis   - EKG no acute ST/T changes. troponin negative. ACS not likely. cardiology following. will obtain echo to be complete  - pt reports unable to tolerate po medications at this time. will change to IV protonix BID  - cont IV zofran prn  - carafate   - pain control  - liquid diet  - GI consult  - gentle IVF    HTN  - amlodipine     DVT ppx: lovenox subq
Assessment:  Deya Alcantara is an 83 year old woman with past medical history of Breast cancer (s/p remote radiation therapy), GERD and Gastric ulcer who presented with epigastric pain, suggestive of gastritis.    The patient reports a longstanding history of gastric ulcer and gastritis pain with eating foods, she reports that this has recurred and also associated with nausea and difficulty swallowing pills. She denies exertional angina or dyspnea. ECG consistent with normal sinus rhythm, no acute ischemic ST abnormalities and troponins negative x 3, no signs of an acute coronary syndrome. Telemetry consistent with sinus rhythm and PVC's. Echo consistent with normal LV and RV systolic function, no significant valvular disease.      Recommendations:  [] Epigastric pain with belching: Suggestive of GI etiology, follow up final GI recommendations. Outpatient nuclear stress test report (8/2023) indicated no ischemia, there was artifact or scar present and medical management was recommended. There are no signs of an acute coronary syndrome.     The patient is CV stable for discharge home with outpatient follow up with her cardiologist. We will sign off, please re-consult if needed.    Ramesh Luna MD  Cardiology   
Patient is an 82 year old F with a PMHx of GERD, gastric ulcers/gastritis, HTN, breast cancer s/p radiation tx in 1985, with epigastric pain, bloating, gas, "spasm" in chest refractory to PPI

## 2023-11-14 NOTE — DISCHARGE NOTE NURSING/CASE MANAGEMENT/SOCIAL WORK - NSDCFUADDAPPT_GEN_ALL_CORE_FT
We made you a hospital followup appointment with Dr. Morgan on 11/29/23 at 2:15pm, office #[phone].

## 2023-11-14 NOTE — DISCHARGE NOTE PROVIDER - HOSPITAL COURSE
83 year old female with past medical history of HTN, HLD, PUD/gastritis, esophageal spasm, who presented with ~1 month history of epigastric pain associated with nausea/increased belching and increased gas w/ orange colored stool. Epigastric pain is constant, non-radiating and is exacerbated with oral intake. She has been taking carafate BID and pantoprazole QD without significant improvement. Pt also reports 10 pound weight loss in 1 month. States the only thing that helps her during these exacerbations is IV Protonix. Admitted with suspected severe gastritis. EKG no acute ST/T changes. troponin negative. ACS not likely. cardiology following. Seen by GI, avoid NSAIDS.  Lactose free soft diet         Code Status: Full     Discharging Provider:  Hima Lr NP  Contact Info: [phone]. Please call with any questions or concerns.    Outpatient Provider:   Dr. Morgan (no answer)

## 2023-11-14 NOTE — DISCHARGE NOTE PROVIDER - ATTENDING DISCHARGE PHYSICAL EXAMINATION:
GENERAL: Not in distress. Alert    HEENT: AT/NC. clear conjuctiva, MMM.   no pallor or icterus  CARDIOVASCULAR: RRR S1, S2. No murmur/rubs/gallop  LUNGS: BLAE+, no rales, no wheezing, no rhonchi.    ABDOMEN: ND. Soft,  mild epigastric TP on deep palpation no guarding / rebound / rigidity. BS normoactive. No CVA tenderness.    BACK: No spine tenderness.  EXTREMITIES: no edema. no leg or calf TP.  SKIN: no rash.  NEUROLOGIC: AAO*3.strength is symmetric, sensation intact, speech fluent.    PSYCHIATRIC: Calm.  No agitation.    Current comorbidities, plan of care, return precautions, fall preventions discussed with the patient and daughter at bedside. verbalized understanding and agreed with the plan. All questions were answered at his/her satisfaction.

## 2023-11-14 NOTE — DISCHARGE NOTE NURSING/CASE MANAGEMENT/SOCIAL WORK - PATIENT PORTAL LINK FT
You can access the FollowMyHealth Patient Portal offered by [facility] by registering at the following website: [email or web address]. By joining [facility]’s FollowMyHealth portal, you will also be able to view your health information using other applications (apps) compatible with our system.

## 2023-11-14 NOTE — DISCHARGE NOTE PROVIDER - NSDCMRMEDTOKEN_GEN_ALL_CORE_FT
amLODIPine 5 mg oral tablet: 1 tab(s) orally once a day (at bedtime)  Crestor 5 mg oral tablet: 1 tab(s) orally once a day  famotidine 40 mg oral tablet: 1 tab(s) orally once a day (at bedtime)  metoprolol succinate 50 mg oral capsule, extended release: 1 cap(s) orally once a day  pantoprazole 40 mg oral delayed release tablet: 1 tab(s) orally every 12 hours  sucralfate 1 g/10 mL oral suspension: 10 milliliter(s) orally 4 times a day   amLODIPine 5 mg oral tablet: 1 tab(s) orally once a day (at bedtime)  Crestor 5 mg oral tablet: 1 tab(s) orally once a day  famotidine 40 mg oral tablet: 1 tab(s) orally once a day (at bedtime)  metoprolol succinate 50 mg oral capsule, extended release: 1 cap(s) orally once a day  pantoprazole 40 mg oral delayed release tablet: 1 tab(s) orally every 12 hours  sucralfate 1 g/10 mL oral suspension: 10 milliliter(s) orally 4 times a day  Xanax 0.25 mg oral tablet: 1 tab(s) orally once a day (at bedtime) as needed for  anxiety MDD: 1

## 2023-11-14 NOTE — PROGRESS NOTE ADULT - NS ATTEND AMEND GEN_ALL_CORE FT
Patient seen and examined at the bedside. Agree with the assessment and plan of CHECO Gibson.  Reports worsening of abdominal discomfort with famotidine but overall feels well today and waiting to go home.  Recommend outpatient follow up with primary GI Dr. Eid for continued management. She agrees.

## 2023-11-14 NOTE — DISCHARGE NOTE NURSING/CASE MANAGEMENT/SOCIAL WORK - NSDCPEFALRISK_GEN_ALL_CORE
For information on Fall & Injury Prevention, visit: [email or web address] OR  [email or web address] OR  https://www.cdc.gov/steadi/patient.html

## 2023-11-14 NOTE — DISCHARGE NOTE PROVIDER - NSDCCPCAREPLAN_GEN_ALL_CORE_FT
PRINCIPAL DISCHARGE DIAGNOSIS  Diagnosis: Gastritis  Assessment and Plan of Treatment: Continue protonix twice daily, pepcid nightly and carafate 4 x daily. Continue a low lactose diet. Follow up with your GI and PCP within the next 1-2 weeks. Return to the ED for worsening abdominal pain, nausea, vomiting or other concerning symptoms. Your cardiac workup is considered negative. CT abdomen showed no acute pathology.      SECONDARY DISCHARGE DIAGNOSES  Diagnosis: Epigastric pain  Assessment and Plan of Treatment:      PRINCIPAL DISCHARGE DIAGNOSIS  Diagnosis: Gastritis  Assessment and Plan of Treatment: Continue protonix twice daily, pepcid nightly and carafate 4 x daily. Continue a low lactose diet. Follow up with your GI and PCP within the next 1-2 weeks. Return to the ED for worsening abdominal pain, nausea, vomiting or other concerning symptoms. Your cardiac workup is considered negative. CT abdomen showed no acute pathology. folow lifestyle modification as instructed.      SECONDARY DISCHARGE DIAGNOSES  Diagnosis: Epigastric pain  Assessment and Plan of Treatment:     Diagnosis: Anxiety  Assessment and Plan of Treatment: as patsyu said your epigastric pain is better with anxiety relieve with xanax and you are taking  xanax, i ordered 6 tab to take at night [ prefered] as needed for sleep and anxiety. can take during the day one tab daily but it can make you drosy and increase risk of fall, injury, cognitive impairment. please consult your primary MD for long term anxiety mangement. do not drink alcohol or other sedatives and opioids while on xanax.

## 2023-11-14 NOTE — DISCHARGE NOTE PROVIDER - NSDCPNSUBOBJ_GEN_ALL_CORE
Patient is a 83y old  Female who presents with a chief complaint of Epigastric Pain (14 Nov 2023 13:34)      Patient seen and examined at bedside. Tolerating diet today. c/o Intermittent abdominal pain    ALLERGIES:  apples, pears peaches figs plums fruit skin      Itchy mouth and tongue (Other)  codeine (Vomiting)  epinephrine (Other)  No Known Drug Allergies    MEDICATIONS  (STANDING):  amLODIPine   Tablet 5 milliGRAM(s) Oral daily  atorvastatin 20 milliGRAM(s) Oral at bedtime  enoxaparin Injectable 40 milliGRAM(s) SubCutaneous every 24 hours  famotidine    Tablet 40 milliGRAM(s) Oral at bedtime  melatonin 3 milliGRAM(s) Oral at bedtime  metoprolol succinate ER 50 milliGRAM(s) Oral daily  pantoprazole    Tablet 40 milliGRAM(s) Oral every 12 hours  sucralfate suspension 1 Gram(s) Oral four times a day    MEDICATIONS  (PRN):  acetaminophen     Tablet .. 650 milliGRAM(s) Oral every 6 hours PRN Temp greater or equal to 38C (100.4F), Mild Pain (1 - 3)  aluminum hydroxide/magnesium hydroxide/simethicone Suspension 30 milliLiter(s) Oral every 4 hours PRN Dyspepsia  metoclopramide Injectable 5 milliGRAM(s) IV Push every 8 hours PRN if persistent nausea despite zofran  ondansetron Injectable 4 milliGRAM(s) IV Push every 8 hours PRN Nausea and/or Vomiting    Vital Signs Last 24 Hrs  T(F): 98 (14 Nov 2023 12:17), Max: 98.2 (14 Nov 2023 05:29)  HR: 74 (14 Nov 2023 12:17) (67 - 83)  BP: 148/85 (14 Nov 2023 12:17) (128/69 - 148/85)  RR: 16 (14 Nov 2023 12:17) (16 - 18)  SpO2: 97% (14 Nov 2023 12:17) (97% - 98%)  I&O's Summary    13 Nov 2023 07:01  -  14 Nov 2023 07:00  --------------------------------------------------------  IN: 200 mL / OUT: 0 mL / NET: 200 mL      PHYSICAL EXAM:  General: NAD, A/O x 3  ENT: MMM, no oral thrush   Neck: Supple, No JVD  Lungs: Clear to auscultation bilaterally, non labored breathing  Cardio: RRR, S1/S2, No murmurs  Abdomen: Soft, Nontender, Nondistended; Bowel sounds present  Extremities: No calf tenderness, No pitting edema    LABS:                        12.9   5.62  )-----------( 216      ( 14 Nov 2023 07:10 )             39.2     11-14    140  |  105  |  8   ----------------------------<  112  4.0   |  30  |  0.77    Ca    9.1      14 Nov 2023 07:10    TPro  6.2  /  Alb  3.1  /  TBili  0.6  /  DBili  x   /  AST  14  /  ALT  20  /  AlkPhos  60  11-13      PT/INR - ( 12 Nov 2023 10:05 )   PT: 11.3 sec;   INR: 0.99 ratio         PTT - ( 12 Nov 2023 10:05 )  PTT:30.9 sec    CARDIAC MARKERS ( 12 Nov 2023 18:00 )  x     / 12.4 ng/L / x     / x     / x      CARDIAC MARKERS ( 12 Nov 2023 14:25 )  x     / 10.9 ng/L / x     / x     / x      CARDIAC MARKERS ( 12 Nov 2023 10:05 )  x     / 5.8 ng/L / x     / x     / x          11-13 Chol 177 mg/dL LDL -- HDL 49 mg/dL Trig 93 mg/dL                  Urinalysis Basic - ( 14 Nov 2023 07:10 )    Color: x / Appearance: x / SG: x / pH: x  Gluc: 112 mg/dL / Ketone: x  / Bili: x / Urobili: x   Blood: x / Protein: x / Nitrite: x   Leuk Esterase: x / RBC: x / WBC x   Sq Epi: x / Non Sq Epi: x / Bacteria: x        Culture - Urine (collected 12 Nov 2023 11:30)  Source: Clean Catch Clean Catch (Midstream)  Final Report (13 Nov 2023 16:16):    <10,000 CFU/mL Normal Urogenital Miladys          RADIOLOGY & ADDITIONAL TESTS:    Care Discussed with Consultants/Other Providers:      Updated daughter at bedside.

## 2023-11-14 NOTE — PROGRESS NOTE ADULT - SUBJECTIVE AND OBJECTIVE BOX
PRAVEENA MARTINEZ  [id number]      Chief Complaint: Epigastric pain    Interval History: The patient reports episodes of epigastric pain and belching. Denies chest pain or shortness of breath.    Tele: sinus rhythm       Current meds:   acetaminophen     Tablet .. 650 milliGRAM(s) Oral every 6 hours PRN  aluminum hydroxide/magnesium hydroxide/simethicone Suspension 30 milliLiter(s) Oral every 4 hours PRN  amLODIPine   Tablet 5 milliGRAM(s) Oral daily  atorvastatin 20 milliGRAM(s) Oral at bedtime  enoxaparin Injectable 40 milliGRAM(s) SubCutaneous every 24 hours  famotidine    Tablet 40 milliGRAM(s) Oral at bedtime  melatonin 3 milliGRAM(s) Oral at bedtime  metoclopramide Injectable 5 milliGRAM(s) IV Push every 8 hours PRN  metoprolol succinate ER 50 milliGRAM(s) Oral daily  ondansetron Injectable 4 milliGRAM(s) IV Push every 8 hours PRN  pantoprazole    Tablet 40 milliGRAM(s) Oral every 12 hours  sucralfate suspension 1 Gram(s) Oral four times a day      Objective:     Vital Signs:   T(C): 36.8 (11-14-23 @ 05:29), Max: 36.9 (11-13-23 @ 12:22)  HR: 83 (11-14-23 @ 05:29) (67 - 83)  BP: 147/76 (11-14-23 @ 05:29) (128/69 - 147/76)  RR: 16 (11-14-23 @ 05:29) (16 - 18)  SpO2: 98% (11-14-23 @ 05:29) (96% - 98%)  Wt(kg): --    Physical Exam:   General: no acute distress  Neck: supple  CVS: JVP ~ 7 cm H20, RRR, s1, s2, no murmurs  Pulm: unlabored respirations, clear to ausculation   Ext: no lower extremity edema   Neuro: awake, alert and oriented   Psych: Normal affect      Labs:   14 Nov 2023 07:10    140    |  105    |  8      ----------------------------<  112    4.0     |  30     |  0.77     Ca    9.1        14 Nov 2023 07:10    TPro  6.2    /  Alb  3.1    /  TBili  0.6    /  DBili  x      /  AST  14     /  ALT  20     /  AlkPhos  60     13 Nov 2023 06:17                          12.9   5.62  )-----------( 216      ( 14 Nov 2023 07:10 )             39.2         ECG (11/12/23): sinus rhythm     TTE (11/13/23):   1. Occasional premature ventricular complexes noted throughout the study.   2. Normal global left ventricular systolic function.   3. Left ventricular ejection fraction, by visual estimation, is 60 to 65%.   4. Normal left ventricular internal cavity size.   5. Mild upper septal hypertrophy (1.2 cm).   6. Normal right ventricular size and function.   7. The left atrium is normal in size.   8. The right atrium is normal in size.   9. Mild mitral valve regurgitation.  10. Mild tricuspid regurgitation.  11. Mild aortic valve leaflet thickening and calcification. No aortic   valve stenosis.  12. Mild aortic regurgitation.  13. Mild pulmonic valve regurgitation.  14. There is no evidence of pericardial effusion.

## 2023-11-14 NOTE — PROGRESS NOTE ADULT - SUBJECTIVE AND OBJECTIVE BOX
INTERVAL HPI/OVERNIGHT EVENTS:  Patient seen examined at bed side, Denies abdominal pain, nausea, vomiting diarrhea or constipation     MEDICATIONS  (STANDING):  amLODIPine   Tablet 5 milliGRAM(s) Oral daily  atorvastatin 20 milliGRAM(s) Oral at bedtime  enoxaparin Injectable 40 milliGRAM(s) SubCutaneous every 24 hours  famotidine    Tablet 40 milliGRAM(s) Oral at bedtime  melatonin 3 milliGRAM(s) Oral at bedtime  metoprolol succinate ER 50 milliGRAM(s) Oral daily  pantoprazole    Tablet 40 milliGRAM(s) Oral every 12 hours  sucralfate suspension 1 Gram(s) Oral four times a day    MEDICATIONS  (PRN):  acetaminophen     Tablet .. 650 milliGRAM(s) Oral every 6 hours PRN Temp greater or equal to 38C (100.4F), Mild Pain (1 - 3)  aluminum hydroxide/magnesium hydroxide/simethicone Suspension 30 milliLiter(s) Oral every 4 hours PRN Dyspepsia  metoclopramide Injectable 5 milliGRAM(s) IV Push every 8 hours PRN if persistent nausea despite zofran  ondansetron Injectable 4 milliGRAM(s) IV Push every 8 hours PRN Nausea and/or Vomiting      Allergies    apples, pears peaches figs plums fruit skin      Itchy mouth and tongue (Other)  No Known Drug Allergies    Intolerances    codeine (Vomiting)  epinephrine (Other)      PAST MEDICAL & SURGICAL HISTORY:  HTN (hypertension)      Sprain rotator cuff  left, had surgery      GERD (gastroesophageal reflux disease)      Gastric ulcer        Breast cancer  in - received radiation treatment      Basal cell carcinoma  of face and chest - removed      Uterine fibroid  S/P hysterectomy      Degenerative disc disease, lumbar      Osteoarthritis of right knee      S/P hysterectomy  in       S/P lumpectomy of breast  left in       H/O repair of left rotator cuff  2012      History of tonsillectomy  10 y/o      PHYSICAL EXAM:   Vital Signs:  Vital Signs Last 24 Hrs  T(C): 36.7 (2023 12:17), Max: 36.8 (2023 05:29)  T(F): 98 (2023 12:17), Max: 98.2 (2023 05:29)  HR: 74 (2023 12:17) (67 - 83)  BP: 148/85 (2023 12:17) (128/69 - 148/85)  BP(mean): --  RR: 16 (2023 12:17) (16 - 18)  SpO2: 97% (2023 12:17) (97% - 98%)    Parameters below as of 2023 12:17  Patient On (Oxygen Delivery Method): room air      Daily     Daily Weight in k.3 (2023 05:29)I&O's Summary    2023 07:01  -  2023 07:00  --------------------------------------------------------  IN: 200 mL / OUT: 0 mL / NET: 200 mL      GENERAL:  Appears stated age  HEENT:  NC/AT,  conjunctivae clear and pink  CHEST:  Full & symmetric excursion, no increased effort, breath sounds clear  HEART:  Regular rhythm, S1, S2  ABDOMEN:  Soft, non-tender, non-distended, normoactive bowel sounds  EXTREMITIES:  no cyanosis, clubbing or edema  SKIN:  No rash/warm/dry  NEURO:  Alert, oriented, no asterixis, no tremor, no encephalopathy      LABS:                        12.9   5.62  )-----------( 216      ( 2023 07:10 )             39.2     11-14    140  |  105  |  8   ----------------------------<  112<H>  4.0   |  30  |  0.77    Ca    9.1      2023 07:10    TPro  6.2  /  Alb  3.1<L>  /  TBili  0.6  /  DBili  x   /  AST  14  /  ALT  20  /  AlkPhos  60  11-13      Urinalysis Basic - ( 2023 07:10 )    Color: x / Appearance: x / SG: x / pH: x  Gluc: 112 mg/dL / Ketone: x  / Bili: x / Urobili: x   Blood: x / Protein: x / Nitrite: x   Leuk Esterase: x / RBC: x / WBC x   Sq Epi: x / Non Sq Epi: x / Bacteria: x      amylase   lipaseLipase: 43 U/L (11-12 @ 10:05)    RADIOLOGY & ADDITIONAL TESTS:   Patient seen examined at bedside, Denies abdominal pain, nausea, vomiting.  Took famotidine yesterday without benefit.    MEDICATIONS  (STANDING):  amLODIPine   Tablet 5 milliGRAM(s) Oral daily  atorvastatin 20 milliGRAM(s) Oral at bedtime  enoxaparin Injectable 40 milliGRAM(s) SubCutaneous every 24 hours  famotidine    Tablet 40 milliGRAM(s) Oral at bedtime  melatonin 3 milliGRAM(s) Oral at bedtime  metoprolol succinate ER 50 milliGRAM(s) Oral daily  pantoprazole    Tablet 40 milliGRAM(s) Oral every 12 hours  sucralfate suspension 1 Gram(s) Oral four times a day    MEDICATIONS  (PRN):  acetaminophen     Tablet .. 650 milliGRAM(s) Oral every 6 hours PRN Temp greater or equal to 38C (100.4F), Mild Pain (1 - 3)  aluminum hydroxide/magnesium hydroxide/simethicone Suspension 30 milliLiter(s) Oral every 4 hours PRN Dyspepsia  metoclopramide Injectable 5 milliGRAM(s) IV Push every 8 hours PRN if persistent nausea despite zofran  ondansetron Injectable 4 milliGRAM(s) IV Push every 8 hours PRN Nausea and/or Vomiting      Allergies    apples, pears peaches figs plums fruit skin      Itchy mouth and tongue (Other)  No Known Drug Allergies    Intolerances    codeine (Vomiting)  epinephrine (Other)      PHYSICAL EXAM:   Vital Signs:  Vital Signs Last 24 Hrs  T(C): 36.7 (2023 12:17), Max: 36.8 (2023 05:29)  T(F): 98 (2023 12:17), Max: 98.2 (2023 05:29)  HR: 74 (2023 12:17) (67 - 83)  BP: 148/85 (2023 12:17) (128/69 - 148/85)  BP(mean): --  RR: 16 (2023 12:17) (16 - 18)  SpO2: 97% (2023 12:17) (97% - 98%)    Parameters below as of 2023 12:17  Patient On (Oxygen Delivery Method): room air      Daily     Daily Weight in k.3 (2023 05:29)I&O's Summary    2023 07:01  -  2023 07:00  --------------------------------------------------------  IN: 200 mL / OUT: 0 mL / NET: 200 mL      GENERAL: NAD  HEENT:  NC/AT,  conjunctivae clear and pink, anictreic sclera  CHEST:  Full & symmetric excursion, no increased effort, breath sounds clear  HEART:  Regular rhythm, S1, S2  ABDOMEN:  Soft, non-tender, non-distended, normoactive bowel sounds  EXTREMITIES:  no cyanosis, clubbing or edema  SKIN:  No rash or jaundice  NEURO:  Alert, oriented      LABS:                        12.9   5.62  )-----------( 216      ( 2023 07:10 )             39.2     11-14    140  |  105  |  8   ----------------------------<  112<H>  4.0   |  30  |  0.77    Ca    9.1      2023 07:10    TPro  6.2  /  Alb  3.1<L>  /  TBili  0.6  /  DBili  x   /  AST  14  /  ALT  20  /  AlkPhos  60  11-13      Urinalysis Basic - ( 2023 07:10 )    Color: x / Appearance: x / SG: x / pH: x  Gluc: 112 mg/dL / Ketone: x  / Bili: x / Urobili: x   Blood: x / Protein: x / Nitrite: x   Leuk Esterase: x / RBC: x / WBC x   Sq Epi: x / Non Sq Epi: x / Bacteria: x

## 2023-11-14 NOTE — DISCHARGE NOTE PROVIDER - CARE PROVIDER_API CALL
Jose Morgan [place]  Internal Medicine  [address] [place] [id number]  Phone: [phone]  Fax: [phone]  Follow Up Time:

## 2023-11-14 NOTE — DISCHARGE NOTE PROVIDER - NSDCFUADDAPPT_GEN_ALL_CORE_FT
We made you a hospital followup appointment with Dr. Morgan on 11/29/23 at 2:15pm, office #[phone].

## 2023-11-14 NOTE — PROGRESS NOTE ADULT - PROBLEM SELECTOR PLAN 1
Per patient, workup by her primary gastroenterologist reportedly unremarkable.  Continue PPI BID   Continue Carafate   Advised a trial of famotidine qhs (IV or PO)  Avoid NSAIDs  Monitor bowel movement   Advance diet to lactose free soft diet   Last EGD 1/2023. Per patient, workup by her primary gastroenterologist reportedly unremarkable.  Continue PPI BID   Continue Carafate   Can DC famotidine  Avoid NSAIDs  Monitor bowel movement   Continue diet.  Last EGD 1/2023.

## 2023-11-15 RX ORDER — SUCRALFATE 1 G
10 TABLET ORAL
Qty: 1200 | Refills: 0
Start: 2023-11-15 | End: 2023-12-14

## 2023-12-29 ENCOUNTER — NON-APPOINTMENT (OUTPATIENT)
Age: 83
End: 2023-12-29

## 2024-01-03 ENCOUNTER — OUTPATIENT (OUTPATIENT)
Dept: OUTPATIENT SERVICES | Facility: HOSPITAL | Age: 84
LOS: 1 days | End: 2024-01-03
Payer: MEDICARE

## 2024-01-03 ENCOUNTER — APPOINTMENT (OUTPATIENT)
Dept: RADIOLOGY | Facility: HOSPITAL | Age: 84
End: 2024-01-03
Payer: MEDICARE

## 2024-01-03 DIAGNOSIS — Z90.89 ACQUIRED ABSENCE OF OTHER ORGANS: Chronic | ICD-10-CM

## 2024-01-03 DIAGNOSIS — R07.81 PLEURODYNIA: ICD-10-CM

## 2024-01-03 DIAGNOSIS — Z98.890 OTHER SPECIFIED POSTPROCEDURAL STATES: Chronic | ICD-10-CM

## 2024-01-03 PROCEDURE — 71101 X-RAY EXAM UNILAT RIBS/CHEST: CPT | Mod: 26,RT

## 2024-01-03 PROCEDURE — 71101 X-RAY EXAM UNILAT RIBS/CHEST: CPT

## 2024-01-21 NOTE — ASU PREOP CHECKLIST - HEIGHT IN CM
Discharge Call Back      Person Contacted: patient    Cory Clemens. This is Ramona Williamson RN calling from [facility].    Dr. Guerrero  Wanted me to call you to see how you are doing.    Patient Condition: Improved    Did your discharge instructions answer all your questions yes    If applicable, have you made a follow-up appointment or received a call for follow up? No    Recommendation: follow-up as needed    Do you have any questions about your care in the Emergency Room, pain control or discharge instructions? no         156.21 Patient/Caregiver provided printed discharge information.

## 2024-07-15 ENCOUNTER — APPOINTMENT (OUTPATIENT)
Dept: ORTHOPEDIC SURGERY | Facility: CLINIC | Age: 84
End: 2024-07-15
Payer: MEDICARE

## 2024-07-15 VITALS — HEIGHT: 61 IN | BODY MASS INDEX: 28.13 KG/M2 | WEIGHT: 149 LBS

## 2024-07-15 DIAGNOSIS — M54.2 CERVICALGIA: ICD-10-CM

## 2024-07-15 DIAGNOSIS — M54.16 RADICULOPATHY, LUMBAR REGION: ICD-10-CM

## 2024-07-15 PROCEDURE — 72100 X-RAY EXAM L-S SPINE 2/3 VWS: CPT

## 2024-07-15 PROCEDURE — 99203 OFFICE O/P NEW LOW 30 MIN: CPT

## 2024-07-15 PROCEDURE — 72040 X-RAY EXAM NECK SPINE 2-3 VW: CPT

## 2024-07-15 RX ORDER — CYCLOBENZAPRINE HYDROCHLORIDE 5 MG/1
5 TABLET, FILM COATED ORAL 3 TIMES DAILY
Qty: 21 | Refills: 0 | Status: ACTIVE | COMMUNITY
Start: 2024-07-15 | End: 1900-01-01

## 2024-07-17 ENCOUNTER — APPOINTMENT (OUTPATIENT)
Dept: MRI IMAGING | Facility: HOSPITAL | Age: 84
End: 2024-07-17
Payer: MEDICARE

## 2024-07-17 ENCOUNTER — OUTPATIENT (OUTPATIENT)
Dept: OUTPATIENT SERVICES | Facility: HOSPITAL | Age: 84
LOS: 1 days | End: 2024-07-17
Payer: MEDICARE

## 2024-07-17 DIAGNOSIS — Z98.890 OTHER SPECIFIED POSTPROCEDURAL STATES: Chronic | ICD-10-CM

## 2024-07-17 DIAGNOSIS — Z90.89 ACQUIRED ABSENCE OF OTHER ORGANS: Chronic | ICD-10-CM

## 2024-07-17 DIAGNOSIS — M54.2 CERVICALGIA: ICD-10-CM

## 2024-07-17 PROCEDURE — 72141 MRI NECK SPINE W/O DYE: CPT

## 2024-07-17 PROCEDURE — 72141 MRI NECK SPINE W/O DYE: CPT | Mod: 26,MH

## 2024-07-24 ENCOUNTER — APPOINTMENT (OUTPATIENT)
Dept: ORTHOPEDIC SURGERY | Facility: CLINIC | Age: 84
End: 2024-07-24
Payer: MEDICARE

## 2024-07-24 VITALS — WEIGHT: 149 LBS | BODY MASS INDEX: 28.13 KG/M2 | HEIGHT: 61 IN

## 2024-07-24 DIAGNOSIS — M48.02 SPINAL STENOSIS, CERVICAL REGION: ICD-10-CM

## 2024-07-24 PROBLEM — M54.12 CERVICAL RADICULOPATHY: Status: ACTIVE | Noted: 2024-07-15

## 2024-07-24 PROCEDURE — 99214 OFFICE O/P EST MOD 30 MIN: CPT

## 2024-07-24 NOTE — HISTORY OF PRESENT ILLNESS
[de-identified] : Ms. PRAVEENA MARTINEZ  is a 84 year old female who presents to the office for a follow-up visit.  She is here to review her MRI results.

## 2024-07-24 NOTE — DISCUSSION/SUMMARY
[de-identified] : We discussed further treatment options.  Her main issue is her cervical spine.  She has more radiculopathy.  No sangeeta weakness.  We discussed physical therapy and epidural injections.  We also discussed seeing a neurologist for an EMG.  She defers this option and will would like to proceed with evaluation for epidural injections.  Follow-up with me afterwards.

## 2024-07-24 NOTE — PHYSICAL EXAM
[Ataxic] : not ataxic [de-identified] : Examination of the cervical spine reveals no midline or paraspinal tenderness to palpation. No cervical lymphadenopathy. Decreased range of motion with respect to flexion, extension, rotation, and lateral bending. Negative Spurlings. Negative Lhermitte's. Full range of motion bilateral shoulders without evidence of impingement. No instability of bilateral upper extremities.  Cranial nerves II through XII grossly intact. Intact sensation bilateral upper extremities. 5/5 deltoids biceps triceps wrist extensors wrist flexors finger flexors and hand intrinsics. 1+ biceps triceps and brachioradialis reflexes. Negative Rouse's. 2+ radial pulse. Negative Tinel's over the cubital and carpal tunnel. No skin lesions on the right and left upper extremities.  Examination of the lumbar spine reveals no midline tenderness palpation, step-offs, or skin lesions. Decreased range of motion with respect to flexion, extension, lateral bending, and rotation. No tenderness to palpation of the sciatic notch. No tenderness palpation of the bilateral greater trochanters. No pain with passive internal/external rotation of the hips. No instability of bilateral lower extremities.  Negative SERENA. Negative straight leg raise bilaterally. No bowstring. Negative femoral stretch. 5 out of 5 iliopsoas, hip abductors, hips adductors, quadriceps, hamstrings, gastrocsoleus, tibialis anterior, extensor hallucis longus, peroneals. Grossly intact sensation to light touch bilateral lower extremities. 1+ patellar and Achilles reflexes. Downgoing Babinski. No clonus. Intact proprioception. Palpable pulses. No skin lesion and no edema on the right and left lower extremities. [de-identified] : AP lateral cervical x-rays with spondylosis  AP lateral lumbar x-rays with spondylosis  Cervical MRI does reveal some increased stenosis in the foramen on the right C5-6 and C6-7

## 2024-07-25 NOTE — CONSULT NOTE ADULT - NS ATTEND BILL GEN_ALL_CORE
Attending to bill
Attending to bill
You can access the FollowMyHealth Patient Portal offered by [facility] by registering at the following website: [email or web address]. By joining [facility]’s FollowMyHealth portal, you will also be able to view your health information using other applications (apps) compatible with our system.

## 2024-07-31 ENCOUNTER — APPOINTMENT (OUTPATIENT)
Dept: PAIN MANAGEMENT | Facility: CLINIC | Age: 84
End: 2024-07-31
Payer: MEDICARE

## 2024-07-31 VITALS — WEIGHT: 149 LBS | HEIGHT: 61 IN | BODY MASS INDEX: 28.13 KG/M2

## 2024-07-31 DIAGNOSIS — M54.12 RADICULOPATHY, CERVICAL REGION: ICD-10-CM

## 2024-07-31 PROCEDURE — 99204 OFFICE O/P NEW MOD 45 MIN: CPT

## 2024-07-31 RX ORDER — TRAMADOL HYDROCHLORIDE 50 MG/1
50 TABLET, COATED ORAL 3 TIMES DAILY
Qty: 25 | Refills: 0 | Status: ACTIVE | COMMUNITY
Start: 2024-07-31 | End: 1900-01-01

## 2024-07-31 NOTE — HISTORY OF PRESENT ILLNESS
[FreeTextEntry1] :  THIS PLEASANT PATIENT IS 84 year OLD  female  WHO PRESENT TODAY IN OFFICE WITH CSPINE RADATING TO [facility] ARM RT ARM GREATER THAN LT ARM, WOULD LIKE TO DISS PLAN OF CARE FOR PAIN      DATE OF INJURY/ONSET  2024     PAIN LEVEL:6-10/10     MECHANISM OF INJURY: UNKOWN INJURAY   QUALITY OF SYMPTOMS:  TINGLING, NUMBNESS, DULL/ACHE      IMPROVES WITH:  COLD, HOT SHOWER       WORSE WITH:       PRIOR TREATMENT:  2024 PATIENT IS PRESENTING WITH ACUTE/SUB-ACUTE RADICULAR PAIN WITH IMPAIRMENT IN ADLs AND FUNCTIONALITY. THE PATIENT HAS NOT RESPONDED TO CONSERVATIVE CARE INCLUDING NSAID THERAPY AND/OR PHYSICAL THERAPY 2-3X A WEEK FOR 2-6 WEEK.           PRIOR IMAGIN2024       SCHOOL/SPORT/POSITION/OCCUPATION:       ADDITIONAL INFORMATION

## 2024-07-31 NOTE — HISTORY OF PRESENT ILLNESS
[FreeTextEntry1] :  THIS PLEASANT PATIENT IS 84 year OLD  female  WHO PRESENT TODAY IN OFFICE WITH CSPINE RADATING TO [facility] ARM RT ARM GREATER THAN LT ARM, WOULD LIKE TO DISS PLAN OF CARE FOR PAIN      DATE OF INJURY/ONSET  2024     PAIN LEVEL:6-10/10     MECHANISM OF INJURY: UNKOWN INJURAY   QUALITY OF SYMPTOMS:  TINGLING, NUMBNESS, DULL/ACHE      IMPROVES WITH:  COLD, HOT SHOWER       WORSE WITH:       PRIOR TREATMENT:  2024 PATIENT IS PRESENTING WITH ACUTE/SUB-ACUTE RADICULAR PAIN WITH IMPAIRMENT IN ADLs AND FUNCTIONALITY. THE PATIENT HAS NOT RESPONDED TO CONSERVATIVE CARE INCLUDING NSAID THERAPY AND/OR PHYSICAL THERAPY 2-3X A WEEK FOR 2-6 WEEK.           PRIOR IMAGIN2024       SCHOOL/SPORT/POSITION/OCCUPATION:       ADDITIONAL INFORMATION

## 2024-07-31 NOTE — ASSESSMENT
[FreeTextEntry1] : Subjective findings This 84-year-old female presents to us with pain in the neck with a radiating component down both arms more prominently on the right than the left.  Patient says she feels that the way that she was sleeping was what brought on this pain.  She denies any bowel or bladder incontinence any weakness but states the pain has become very problematic for her.  Patient was evaluated by surgeon who feels that she would benefit from epidural steroid injections.  Patient presents to us for evaluation.  The CV/Pulm/GI/Heme/Renal/Hepatic//Psych/Endo systems are reviewed. A full ROS was performed and reviewed with the patient today, see intake form.  Average pain score for the month is 7 out of ten. The patient's current medications are documented to the best of their ability. The patient has failed conservative therapies to include medical management, and physical activity to treat the pain. The patient has decreased function secondary to the pain. Objective findings Imaging studies are consistent with the patient's pain complaints.  Patient has decreased range of motion of her cervical spine but normal range of motion of her upper extremities bilaterally.  Motor and sensory exams appear grossly intact. Plan Spoke to patient about epidural steroid injections. Explained risks, benefits and alternatives including but not limited to the risk of infection, bleeding, headache, syncopal episode, failure to resolve issues, allergic reaction, symptom recurrence, allergic reaction, nerve injury, and increased pain. The patient understands the risks. All questions were answered. The patient is willing to proceed. The importance of increasing exercise after the injection is also stressed. The use of the injection as a jump start so that the patient can do more exercise, but that the exercise is the long-term answer for the patient is reviewed. The patient states understanding.  This note was generated by using Dragon medical dictation software.  A reasonable effort has been made for proofreading its contents, but typos may still remain.  If there are any questions or points of clarification needed, please notify my office.

## 2024-08-02 ENCOUNTER — TRANSCRIPTION ENCOUNTER (OUTPATIENT)
Age: 84
End: 2024-08-02

## 2024-08-02 NOTE — ASU PATIENT PROFILE, ADULT - FALL HARM RISK - UNIVERSAL INTERVENTIONS
Bed in lowest position, wheels locked, appropriate side rails in place/Call bell, personal items and telephone in reach/Instruct patient to call for assistance before getting out of bed or chair/Non-slip footwear when patient is out of bed/[place] to call system/Physically safe environment - no spills, clutter or unnecessary equipment/Purposeful Proactive Rounding/Room/bathroom lighting operational, light cord in reach

## 2024-08-05 ENCOUNTER — APPOINTMENT (OUTPATIENT)
Dept: ORTHOPEDIC SURGERY | Facility: HOSPITAL | Age: 84
End: 2024-08-05

## 2024-08-05 ENCOUNTER — OUTPATIENT (OUTPATIENT)
Dept: OUTPATIENT SERVICES | Facility: HOSPITAL | Age: 84
LOS: 1 days | End: 2024-08-05
Payer: MEDICARE

## 2024-08-05 VITALS
HEIGHT: 61 IN | WEIGHT: 154.98 LBS | TEMPERATURE: 98 F | OXYGEN SATURATION: 100 % | HEART RATE: 69 BPM | RESPIRATION RATE: 22 BRPM | SYSTOLIC BLOOD PRESSURE: 154 MMHG | DIASTOLIC BLOOD PRESSURE: 77 MMHG

## 2024-08-05 VITALS
HEART RATE: 67 BPM | RESPIRATION RATE: 14 BRPM | OXYGEN SATURATION: 98 % | SYSTOLIC BLOOD PRESSURE: 138 MMHG | DIASTOLIC BLOOD PRESSURE: 55 MMHG

## 2024-08-05 DIAGNOSIS — Z98.890 OTHER SPECIFIED POSTPROCEDURAL STATES: Chronic | ICD-10-CM

## 2024-08-05 DIAGNOSIS — M54.12 RADICULOPATHY, CERVICAL REGION: ICD-10-CM

## 2024-08-05 DIAGNOSIS — Z90.89 ACQUIRED ABSENCE OF OTHER ORGANS: Chronic | ICD-10-CM

## 2024-08-05 PROCEDURE — 62321 NJX INTERLAMINAR CRV/THRC: CPT

## 2024-08-14 NOTE — ED ADULT NURSE NOTE - OBJECTIVE STATEMENT
pt with abd pain -epigastric Pt presents to ED from home with c/o epigastric pain. Pt with history of stomach ulcers, states acid reflux x1 week and was started on nexium, carafate, and xanax at home but she thinks she is getting worse. She states she needs an EGD, but is unable to get one at this time due to office closures. She reports nausea and belching. 153

## 2025-04-16 ENCOUNTER — EMERGENCY (EMERGENCY)
Facility: HOSPITAL | Age: 85
LOS: 1 days | End: 2025-04-16
Attending: EMERGENCY MEDICINE | Admitting: INTERNAL MEDICINE
Payer: MEDICARE

## 2025-04-16 VITALS
DIASTOLIC BLOOD PRESSURE: 106 MMHG | TEMPERATURE: 99 F | SYSTOLIC BLOOD PRESSURE: 196 MMHG | HEART RATE: 101 BPM | OXYGEN SATURATION: 100 % | RESPIRATION RATE: 16 BRPM

## 2025-04-16 VITALS
HEART RATE: 71 BPM | DIASTOLIC BLOOD PRESSURE: 74 MMHG | SYSTOLIC BLOOD PRESSURE: 142 MMHG | RESPIRATION RATE: 16 BRPM | OXYGEN SATURATION: 100 %

## 2025-04-16 DIAGNOSIS — Z90.89 ACQUIRED ABSENCE OF OTHER ORGANS: Chronic | ICD-10-CM

## 2025-04-16 DIAGNOSIS — Z98.890 OTHER SPECIFIED POSTPROCEDURAL STATES: Chronic | ICD-10-CM

## 2025-04-16 LAB
ALBUMIN SERPL ELPH-MCNC: 3.6 G/DL — SIGNIFICANT CHANGE UP (ref 3.3–5)
ALP SERPL-CCNC: 89 U/L — SIGNIFICANT CHANGE UP (ref 40–120)
ALT FLD-CCNC: 33 U/L — SIGNIFICANT CHANGE UP (ref 10–45)
ANION GAP SERPL CALC-SCNC: 10 MMOL/L — SIGNIFICANT CHANGE UP (ref 5–17)
APTT BLD: 30.3 SEC — SIGNIFICANT CHANGE UP (ref 24.5–35.6)
AST SERPL-CCNC: 24 U/L — SIGNIFICANT CHANGE UP (ref 10–40)
BASOPHILS # BLD AUTO: 0.04 K/UL — SIGNIFICANT CHANGE UP (ref 0–0.2)
BASOPHILS NFR BLD AUTO: 0.5 % — SIGNIFICANT CHANGE UP (ref 0–2)
BILIRUB SERPL-MCNC: 0.6 MG/DL — SIGNIFICANT CHANGE UP (ref 0.2–1.2)
BUN SERPL-MCNC: 17 MG/DL — SIGNIFICANT CHANGE UP (ref 7–23)
CALCIUM SERPL-MCNC: 9.2 MG/DL — SIGNIFICANT CHANGE UP (ref 8.4–10.5)
CHLORIDE SERPL-SCNC: 103 MMOL/L — SIGNIFICANT CHANGE UP (ref 96–108)
CO2 SERPL-SCNC: 28 MMOL/L — SIGNIFICANT CHANGE UP (ref 22–31)
CREAT SERPL-MCNC: 0.67 MG/DL — SIGNIFICANT CHANGE UP (ref 0.5–1.3)
EGFR: 87 ML/MIN/1.73M2 — SIGNIFICANT CHANGE UP
EGFR: 87 ML/MIN/1.73M2 — SIGNIFICANT CHANGE UP
EOSINOPHIL # BLD AUTO: 0.11 K/UL — SIGNIFICANT CHANGE UP (ref 0–0.5)
EOSINOPHIL NFR BLD AUTO: 1.3 % — SIGNIFICANT CHANGE UP (ref 0–6)
GLUCOSE BLDC GLUCOMTR-MCNC: 156 MG/DL — HIGH (ref 70–99)
GLUCOSE SERPL-MCNC: 172 MG/DL — HIGH (ref 70–99)
HCT VFR BLD CALC: 43.6 % — SIGNIFICANT CHANGE UP (ref 34.5–45)
HGB BLD-MCNC: 14.9 G/DL — SIGNIFICANT CHANGE UP (ref 11.5–15.5)
IMM GRANULOCYTES NFR BLD AUTO: 0.2 % — SIGNIFICANT CHANGE UP (ref 0–0.9)
INR BLD: 0.96 RATIO — SIGNIFICANT CHANGE UP (ref 0.85–1.16)
LYMPHOCYTES # BLD AUTO: 2.59 K/UL — SIGNIFICANT CHANGE UP (ref 1–3.3)
LYMPHOCYTES # BLD AUTO: 31.3 % — SIGNIFICANT CHANGE UP (ref 13–44)
MCHC RBC-ENTMCNC: 31 PG — SIGNIFICANT CHANGE UP (ref 27–34)
MCHC RBC-ENTMCNC: 34.2 G/DL — SIGNIFICANT CHANGE UP (ref 32–36)
MCV RBC AUTO: 90.8 FL — SIGNIFICANT CHANGE UP (ref 80–100)
MONOCYTES # BLD AUTO: 0.46 K/UL — SIGNIFICANT CHANGE UP (ref 0–0.9)
MONOCYTES NFR BLD AUTO: 5.6 % — SIGNIFICANT CHANGE UP (ref 2–14)
NEUTROPHILS # BLD AUTO: 5.05 K/UL — SIGNIFICANT CHANGE UP (ref 1.8–7.4)
NEUTROPHILS NFR BLD AUTO: 61.1 % — SIGNIFICANT CHANGE UP (ref 43–77)
NRBC BLD AUTO-RTO: 0 /100 WBCS — SIGNIFICANT CHANGE UP (ref 0–0)
NT-PROBNP SERPL-SCNC: 172 PG/ML — SIGNIFICANT CHANGE UP (ref 0–300)
PLATELET # BLD AUTO: 252 K/UL — SIGNIFICANT CHANGE UP (ref 150–400)
POTASSIUM SERPL-MCNC: 3.4 MMOL/L — LOW (ref 3.5–5.3)
POTASSIUM SERPL-SCNC: 3.4 MMOL/L — LOW (ref 3.5–5.3)
PROT SERPL-MCNC: 7.5 G/DL — SIGNIFICANT CHANGE UP (ref 6–8.3)
PROTHROM AB SERPL-ACNC: 11.3 SEC — SIGNIFICANT CHANGE UP (ref 9.9–13.4)
RBC # BLD: 4.8 M/UL — SIGNIFICANT CHANGE UP (ref 3.8–5.2)
RBC # FLD: 12.5 % — SIGNIFICANT CHANGE UP (ref 10.3–14.5)
SODIUM SERPL-SCNC: 141 MMOL/L — SIGNIFICANT CHANGE UP (ref 135–145)
TROPONIN I, HIGH SENSITIVITY RESULT: 10.7 NG/L — SIGNIFICANT CHANGE UP
WBC # BLD: 8.27 K/UL — SIGNIFICANT CHANGE UP (ref 3.8–10.5)
WBC # FLD AUTO: 8.27 K/UL — SIGNIFICANT CHANGE UP (ref 3.8–10.5)

## 2025-04-16 PROCEDURE — 0042T: CPT

## 2025-04-16 PROCEDURE — 99285 EMERGENCY DEPT VISIT HI MDM: CPT | Mod: 25

## 2025-04-16 PROCEDURE — 36415 COLL VENOUS BLD VENIPUNCTURE: CPT

## 2025-04-16 PROCEDURE — 84484 ASSAY OF TROPONIN QUANT: CPT

## 2025-04-16 PROCEDURE — 70498 CT ANGIOGRAPHY NECK: CPT | Mod: 26

## 2025-04-16 PROCEDURE — 0042T: CPT | Mod: MC

## 2025-04-16 PROCEDURE — 93005 ELECTROCARDIOGRAM TRACING: CPT

## 2025-04-16 PROCEDURE — 82962 GLUCOSE BLOOD TEST: CPT

## 2025-04-16 PROCEDURE — 70450 CT HEAD/BRAIN W/O DYE: CPT | Mod: MC

## 2025-04-16 PROCEDURE — 80053 COMPREHEN METABOLIC PANEL: CPT

## 2025-04-16 PROCEDURE — 99285 EMERGENCY DEPT VISIT HI MDM: CPT

## 2025-04-16 PROCEDURE — 85610 PROTHROMBIN TIME: CPT

## 2025-04-16 PROCEDURE — 85730 THROMBOPLASTIN TIME PARTIAL: CPT

## 2025-04-16 PROCEDURE — 93010 ELECTROCARDIOGRAM REPORT: CPT

## 2025-04-16 PROCEDURE — 70450 CT HEAD/BRAIN W/O DYE: CPT | Mod: 26,XU

## 2025-04-16 PROCEDURE — 85025 COMPLETE CBC W/AUTO DIFF WBC: CPT

## 2025-04-16 PROCEDURE — 70496 CT ANGIOGRAPHY HEAD: CPT | Mod: 26

## 2025-04-16 PROCEDURE — 70498 CT ANGIOGRAPHY NECK: CPT | Mod: MC

## 2025-04-16 PROCEDURE — 70496 CT ANGIOGRAPHY HEAD: CPT | Mod: MC

## 2025-04-16 PROCEDURE — 83880 ASSAY OF NATRIURETIC PEPTIDE: CPT

## 2025-04-16 RX ORDER — ALPRAZOLAM 0.5 MG
0.5 TABLET, EXTENDED RELEASE 24 HR ORAL ONCE
Refills: 0 | Status: DISCONTINUED | OUTPATIENT
Start: 2025-04-16 | End: 2025-04-16

## 2025-04-16 RX ORDER — AMLODIPINE BESYLATE 10 MG/1
10 TABLET ORAL ONCE
Refills: 0 | Status: COMPLETED | OUTPATIENT
Start: 2025-04-16 | End: 2025-04-16

## 2025-04-16 RX ADMIN — AMLODIPINE BESYLATE 10 MILLIGRAM(S): 10 TABLET ORAL at 19:48

## 2025-04-16 RX ADMIN — Medication 0.5 MILLIGRAM(S): at 19:48

## 2025-04-17 NOTE — ED ADULT NURSE NOTE - CAS DISCH BELONGINGS RETURNED
PLEASE CALL PATIENT  - HE NEEDS FASTING BLOOD TEST TO INCLUDE THE FOLLOWING.       ASSESSMENT AND PLAN :    Mixed hyperlipidemia (Primary)  - Comprehensive Metabolic Panel; Future  - Lipid Panel With Reflex; Future    Prediabetes  - Comprehensive Metabolic Panel; Future  - Glycohemoglobin; Future      Erinn GOINS Navin, LORENA            _________________________________________________________  E message      I had bloodwork done recently but it was for psa and did not show me my A1C count.  I have not had a A1C test in 2 years.  Or since 2023.  Let me know on this and you can  book and appointment if needed.  Thank you .     Al Plasencia is out of the office until 4/23/25, but I have place the order for you to go and have your A1C done. You can just go to the lab and have it done today if you would like to.     Thank you,  DANIEL Cleary        The above is from the e-message 4/17/2025.  I could not get the message open.      He needs a fasting CMP and Lipid panel that I had ordered in 10/8/2024 but he had not completed.      I have no documented elevated blood sugar back to 4/2021.    5 years ago at outside facility was minimally prediabetic at 5.8 with blood sugar 120.      Component      Latest Ref Rng 4/15/2021  9:58 AM 5/18/2022  8:11 AM 5/17/2023  10:08 AM 10/17/2023  9:53 AM 2/10/2025  9:48 AM   Fasting Status      0 - 999 Hours 4  12  12  12     Fasting Status       4        Sodium      135 - 145 mmol/L 141   140  139     Potassium      3.4 - 5.1 mmol/L 4.0   4.5  4.4     Chloride      97 - 110 mmol/L 103   101  107     CO2      21 - 32 mmol/L 30   30  29     ANION GAP      7 - 19 mmol/L 12   14  7     Glucose      70 - 99 mg/dL 96   92  96     BUN      6 - 20 mg/dL 20   22 (H)  23 (H)     Creatinine      0.67 - 1.17 mg/dL 0.83   0.96  0.88     Glomerular Filtration Rate      >=60  >90   87  >90     BUN/CREATININE RATIO      7 - 25  24   23  26 (H)     CALCIUM      8.4 - 10.2 mg/dL 9.3   9.5  9.7     TOTAL  BILIRUBIN      0.2 - 1.0 mg/dL   0.6  0.5     AST/SGOT      <=37 Units/L   15  14     ALT/SGPT      <64 Units/L   22  25     ALK PHOSPHATASE      45 - 117 Units/L   87  94     Albumin      3.6 - 5.1 g/dL   4.0  3.9     TOTAL PROTEIN      6.4 - 8.2 g/dL   7.3  7.2     GLOBULIN      2.0 - 4.0 g/dL   3.3  3.3     A/G Ratio, Serum      1.0 - 2.4    1.2  1.2     CHOLESTEROL      <=199 mg/dL 212 (H)  210 (H)  214 (H)  174     TRIGLYCERIDE      <=149 mg/dL 106  78  79  48     HDL      >=40 mg/dL 51  47  54  62     CALCULATED LDL      <=129 mg/dL 140 (H)  147 (H)  144 (H)  102     CALCULATED NON HDL      mg/dL 161  163  160  112     CHOL/HDL      <=4.4  4.2  4.5 (H)  4.0  2.8     GLYCOHEMOGLOBIN A1C      4.5 - 5.6 % 5.7 (H)  5.7 (H)  5.5  5.5     PSA, Total      <=4.50 ng/mL     6.14 (H)      Component      Latest Ref Rng 3/11/2025  9:17 AM   Fasting Status      0 - 999 Hours    Sodium      135 - 145 mmol/L    Potassium      3.4 - 5.1 mmol/L    Chloride      97 - 110 mmol/L    CO2      21 - 32 mmol/L    ANION GAP      7 - 19 mmol/L    Glucose      70 - 99 mg/dL    BUN      6 - 20 mg/dL    Creatinine      0.67 - 1.17 mg/dL    Glomerular Filtration Rate      >=60     BUN/CREATININE RATIO      7 - 25     CALCIUM      8.4 - 10.2 mg/dL    TOTAL BILIRUBIN      0.2 - 1.0 mg/dL    AST/SGOT      <=37 Units/L    ALT/SGPT      <64 Units/L    ALK PHOSPHATASE      45 - 117 Units/L    Albumin      3.6 - 5.1 g/dL    TOTAL PROTEIN      6.4 - 8.2 g/dL    GLOBULIN      2.0 - 4.0 g/dL    A/G Ratio, Serum      1.0 - 2.4     CHOLESTEROL      <=199 mg/dL    TRIGLYCERIDE      <=149 mg/dL    HDL      >=40 mg/dL    CALCULATED LDL      <=129 mg/dL    CALCULATED NON HDL      mg/dL    CHOL/HDL      <=4.4     GLYCOHEMOGLOBIN A1C      4.5 - 5.6 %    PSA, Total      <=4.50 ng/mL 3.73         Component  Ref Range & Units 5 yr ago   HGBA1C  4.2 - 5.6 % 5.8 High    Average blood sugar calculated from the HbA1c 120   Comment: The HgA1c test (A1c) is a  measure of your average blood  sugar levels over the past three months. It's a screening  test for diabetes or if you have diabetes, it tells us how  well your diabetes is controlled.     Patient has been diagnosed in past with prediabetes          Erinn Holden NP     with patient

## 2025-05-28 ENCOUNTER — OUTPATIENT (OUTPATIENT)
Dept: OUTPATIENT SERVICES | Facility: HOSPITAL | Age: 85
LOS: 1 days | End: 2025-05-28
Payer: MEDICARE

## 2025-05-28 ENCOUNTER — APPOINTMENT (OUTPATIENT)
Dept: MRI IMAGING | Facility: HOSPITAL | Age: 85
End: 2025-05-28
Payer: MEDICARE

## 2025-05-28 DIAGNOSIS — M50.20 OTHER CERVICAL DISC DISPLACEMENT, UNSPECIFIED CERVICAL REGION: ICD-10-CM

## 2025-05-28 DIAGNOSIS — Z90.89 ACQUIRED ABSENCE OF OTHER ORGANS: Chronic | ICD-10-CM

## 2025-05-28 DIAGNOSIS — Z98.890 OTHER SPECIFIED POSTPROCEDURAL STATES: Chronic | ICD-10-CM

## 2025-05-28 PROCEDURE — 72141 MRI NECK SPINE W/O DYE: CPT | Mod: 26

## 2025-05-28 PROCEDURE — 72141 MRI NECK SPINE W/O DYE: CPT | Mod: MH

## 2025-05-28 PROCEDURE — 70553 MRI BRAIN STEM W/O & W/DYE: CPT | Mod: 26

## 2025-05-28 PROCEDURE — 70553 MRI BRAIN STEM W/O & W/DYE: CPT | Mod: MH

## 2025-05-28 PROCEDURE — A9579: CPT

## 2025-07-02 NOTE — ED ADULT NURSE NOTE - NS ED NURSE RECORD ANOTHER HT AND WT
"Subjective   Patient ID: Jud Colunga is a 58 y.o. female who presents for Med Refill.    HPI Pt is here for refills of medications to treat the following medical conditions. Unless otherwise stated, these conditions are well-controlled, pt is taking medications as Rx, and there are no reported side effects to medications or other treatment: Anxeity, GERD, Hyperglycemia, Hyperlipidemia, Paroxysmal atrial fibrillation, Vitamin D.     PT is scheduled for a Lumpectomy later this month following recent diagnosis of breast cancer. Pt reports overall she is doing well and has no new concerns. She is happy with her care team.     Review of Systems  Constitutional: Negative.    HENT: Negative.     Respiratory: Negative.  Negative for cough, shortness of breath and wheezing.    Cardiovascular: Negative.  Negative for chest pain and palpitations.   Gastrointestinal: Negative.    Musculoskeletal: Negative.    Neurological: Negative.    Hematological: Negative.    Psychiatric/Behavioral: Negative.     All other systems reviewed and are negative.    Objective   /68 (BP Location: Left arm, Patient Position: Sitting, BP Cuff Size: Large adult)   Pulse 86   Temp 36.9 °C (98.4 °F) (Oral)   Ht 1.702 m (5' 7\")   Wt 100 kg (220 lb 6.4 oz)   LMP 11/21/2024 (Approximate)   SpO2 98%   BMI 34.52 kg/m²     Physical Exam  Vitals and nursing note reviewed.   Constitutional:       General Appearance: Normal appearance, no distress, not ill-appearing.   HENT:      Head: Normocephalic and atraumatic.      Mouth/Throat:  MMM, Oropharynx is clear without erythema or exudate  Eyes:      Conjunctiva/sclera: Conjunctivae normal.   Cardiovascular:      Normal rate and regular rhythm: Normal heart sounds.   Pulmonary:      Pulmonary effort is normal. Normal breath sounds. No wheezing.   Musculoskeletal:         General: Normal range of motion.    Lymphadenopathy:      Cervical:  Supple, non-tender, no cervical adenopathy.   Skin:     " General: Skin is warm and dry, no rash or jaundice.    Neurological:      No focal deficit present. Alert and oriented to person, place, and time.   Psychiatric:         Mood and Affect: Mood normal.         Behavior: Behavior normal.         Thought Content: Thought content normal.         Judgment: Judgment normal.      Assessment/Plan  Jud Colunga presents for chronic medication refills without complaint.  She appears to be doing well - her exam was unremarkable. Will renew chronic medication(s) without changes and check labs. She will follow up if necessary once labs are reviewed otherwise we will see her back when she is  due for refills - sooner if she has  any complications.  We will plan on seeing her back in 6 mo at which time we will do her well exam - orders were placed for labs prior to her appt.   Diagnoses and all orders for this visit:  Hyperlipidemia, unspecified hyperlipidemia type  -     atorvastatin (Lipitor) 20 mg tablet; Take 1 tablet (20 mg) by mouth once daily. as directed.   Appointment needed for refills.  -     Lipid Panel; Future  -     Comprehensive Metabolic Panel; Future  -     Comprehensive Metabolic Panel; Future  -     Lipid Panel; Future  Vitamin D deficiency  -     cholecalciferol (Vitamin D3) 50 mcg (2,000 units) capsule; Take 1 capsule (50 mcg) by mouth once daily.  -     Vitamin D 25-Hydroxy,Total (for eval of Vitamin D levels); Future  Paroxysmal atrial fibrillation (Multi)  -     dilTIAZem CD (Cardizem CD) 120 mg 24 hr capsule; Take 1 capsule (120 mg) by mouth once daily.  Hyperglycemia  -     Hemoglobin A1C; Future  -     metFORMIN XR (Glucophage-XR) 500 mg 24 hr tablet; Take 1 tablet (500 mg) by mouth once daily with breakfast. Do not crush, chew, or split.  -     Hemoglobin A1C; Future  -     Albumin-Creatinine Ratio, Urine Random; Future  Gastroesophageal reflux disease, unspecified whether esophagitis present  -     omeprazole (PriLOSEC) 20 mg DR capsule; Take 1 capsule  (20 mg) by mouth once daily.  Anxiety  -     sertraline (Zoloft) 100 mg tablet; Take 2 tablets (200 mg) by mouth once daily.  Well adult exam  -     Comprehensive Metabolic Panel; Future  -     Lipid Panel; Future  -     CBC; Future          Yes

## (undated) DEVICE — TRAY EPIDURAL SINGLE DOSE

## (undated) DEVICE — GLV 8.5 PROTEXIS (WHITE)

## (undated) DEVICE — NDL SPINAL 22G X 3.5" QUINCKE

## (undated) DEVICE — STYLET  ENDOTRACH 7.5MM X 10MM